# Patient Record
Sex: FEMALE | Race: BLACK OR AFRICAN AMERICAN | NOT HISPANIC OR LATINO | Employment: FULL TIME | URBAN - METROPOLITAN AREA
[De-identification: names, ages, dates, MRNs, and addresses within clinical notes are randomized per-mention and may not be internally consistent; named-entity substitution may affect disease eponyms.]

---

## 2023-10-31 PROBLEM — D57.3 SICKLE CELL TRAIT (CMS-HCC): Status: ACTIVE | Noted: 2023-10-31

## 2023-10-31 PROBLEM — B96.89 BV (BACTERIAL VAGINOSIS): Status: ACTIVE | Noted: 2023-10-31

## 2023-10-31 PROBLEM — N76.0 BV (BACTERIAL VAGINOSIS): Status: ACTIVE | Noted: 2023-10-31

## 2023-10-31 PROBLEM — N76.4 VULVAR ABSCESS: Status: ACTIVE | Noted: 2023-10-31

## 2023-10-31 PROBLEM — N75.0 BARTHOLIN'S CYST: Status: ACTIVE | Noted: 2023-10-31

## 2023-10-31 PROBLEM — E04.9 GOITER: Status: ACTIVE | Noted: 2023-10-31

## 2023-10-31 RX ORDER — MINOCYCLINE HYDROCHLORIDE 55 MG/1
1 TABLET, FILM COATED, EXTENDED RELEASE ORAL
COMMUNITY
Start: 2016-09-16 | End: 2023-11-01 | Stop reason: ALTCHOICE

## 2023-10-31 RX ORDER — IBUPROFEN 600 MG/1
600 TABLET ORAL EVERY 6 HOURS PRN
COMMUNITY
Start: 2019-07-20 | End: 2023-11-01 | Stop reason: ALTCHOICE

## 2023-10-31 RX ORDER — AZITHROMYCIN 250 MG/1
TABLET, FILM COATED ORAL
COMMUNITY
Start: 2021-12-13 | End: 2023-11-01 | Stop reason: ALTCHOICE

## 2023-10-31 RX ORDER — DOXYCYCLINE 100 MG/1
CAPSULE ORAL
COMMUNITY
Start: 2022-09-19 | End: 2023-11-01 | Stop reason: ALTCHOICE

## 2023-10-31 RX ORDER — MINOCYCLINE HYDROCHLORIDE 100 MG/1
100 CAPSULE ORAL
COMMUNITY
Start: 2016-09-19 | End: 2023-11-01 | Stop reason: ALTCHOICE

## 2023-10-31 RX ORDER — ASPIRIN 81 MG/1
2 TABLET ORAL DAILY
COMMUNITY
Start: 2022-05-03 | End: 2023-11-01 | Stop reason: ALTCHOICE

## 2023-10-31 RX ORDER — TRAMADOL HYDROCHLORIDE 50 MG/1
1 TABLET ORAL EVERY 8 HOURS
COMMUNITY
Start: 2022-06-09 | End: 2023-11-01 | Stop reason: ALTCHOICE

## 2023-10-31 RX ORDER — BUTALBITAL, ACETAMINOPHEN AND CAFFEINE 300; 40; 50 MG/1; MG/1; MG/1
1 CAPSULE ORAL EVERY 4 HOURS PRN
COMMUNITY
Start: 2021-09-15 | End: 2023-11-01 | Stop reason: ALTCHOICE

## 2023-10-31 RX ORDER — METRONIDAZOLE 250 MG/1
TABLET ORAL
COMMUNITY
Start: 2022-09-19 | End: 2023-11-01 | Stop reason: ALTCHOICE

## 2023-10-31 RX ORDER — DOCUSATE SODIUM 100 MG/1
100 CAPSULE, LIQUID FILLED ORAL 2 TIMES DAILY
COMMUNITY
Start: 2019-07-20 | End: 2023-11-01 | Stop reason: ALTCHOICE

## 2023-10-31 RX ORDER — CLINDAMYCIN PHOSPHATE 10 UG/ML
1 LOTION TOPICAL
COMMUNITY
Start: 2016-09-16 | End: 2023-11-01 | Stop reason: ALTCHOICE

## 2023-10-31 RX ORDER — ONDANSETRON 4 MG/1
4 TABLET, ORALLY DISINTEGRATING ORAL EVERY 8 HOURS PRN
COMMUNITY
Start: 2022-05-03 | End: 2023-11-01 | Stop reason: ALTCHOICE

## 2023-10-31 RX ORDER — METRONIDAZOLE 7.5 MG/G
GEL VAGINAL
COMMUNITY
Start: 2022-10-03 | End: 2023-11-01 | Stop reason: ALTCHOICE

## 2023-10-31 RX ORDER — ACETAMINOPHEN 325 MG/1
3 TABLET ORAL EVERY 8 HOURS PRN
COMMUNITY
Start: 2021-01-30 | End: 2023-11-01 | Stop reason: ALTCHOICE

## 2023-11-01 ENCOUNTER — INITIAL PRENATAL (OUTPATIENT)
Dept: OBSTETRICS AND GYNECOLOGY | Facility: CLINIC | Age: 23
End: 2023-11-01
Payer: COMMERCIAL

## 2023-11-01 VITALS — BODY MASS INDEX: 22.68 KG/M2 | WEIGHT: 148.8 LBS | DIASTOLIC BLOOD PRESSURE: 77 MMHG | SYSTOLIC BLOOD PRESSURE: 116 MMHG

## 2023-11-01 DIAGNOSIS — O21.9 NAUSEA AND VOMITING IN PREGNANCY PRIOR TO 22 WEEKS GESTATION (HHS-HCC): Primary | ICD-10-CM

## 2023-11-01 DIAGNOSIS — Z87.59 HISTORY OF VACUUM EXTRACTION ASSISTED DELIVERY: ICD-10-CM

## 2023-11-01 DIAGNOSIS — Z87.59 HISTORY OF GESTATIONAL HYPERTENSION: ICD-10-CM

## 2023-11-01 DIAGNOSIS — N92.6 MISSED MENSES: ICD-10-CM

## 2023-11-01 DIAGNOSIS — D57.3 SICKLE CELL TRAIT (CMS-HCC): ICD-10-CM

## 2023-11-01 DIAGNOSIS — Z32.01 PREGNANCY TEST POSITIVE (HHS-HCC): ICD-10-CM

## 2023-11-01 LAB — PREGNANCY TEST URINE, POC: POSITIVE

## 2023-11-01 PROCEDURE — 87800 DETECT AGNT MULT DNA DIREC: CPT | Performed by: OBSTETRICS & GYNECOLOGY

## 2023-11-01 PROCEDURE — 87086 URINE CULTURE/COLONY COUNT: CPT | Performed by: OBSTETRICS & GYNECOLOGY

## 2023-11-01 PROCEDURE — 0500F INITIAL PRENATAL CARE VISIT: CPT | Performed by: OBSTETRICS & GYNECOLOGY

## 2023-11-01 PROCEDURE — 87661 TRICHOMONAS VAGINALIS AMPLIF: CPT | Mod: 59 | Performed by: OBSTETRICS & GYNECOLOGY

## 2023-11-01 RX ORDER — HYDROCORTISONE 25 MG/G
1 CREAM TOPICAL
COMMUNITY
Start: 2016-08-30 | End: 2023-11-01 | Stop reason: ALTCHOICE

## 2023-11-01 RX ORDER — TRETINOIN 0.25 MG/G
1 CREAM TOPICAL
COMMUNITY
Start: 2016-09-16 | End: 2023-11-01 | Stop reason: ALTCHOICE

## 2023-11-01 RX ORDER — PYRIDOXINE HCL (VITAMIN B6) 25 MG
25 TABLET ORAL EVERY 8 HOURS
Qty: 90 TABLET | Refills: 2 | Status: SHIPPED | OUTPATIENT
Start: 2023-11-01 | End: 2024-01-30

## 2023-11-01 RX ORDER — ONDANSETRON 4 MG/1
4 TABLET, FILM COATED ORAL EVERY 12 HOURS PRN
Qty: 30 TABLET | Refills: 0 | Status: SHIPPED | OUTPATIENT
Start: 2023-11-01 | End: 2023-12-01

## 2023-11-01 ASSESSMENT — EDINBURGH POSTNATAL DEPRESSION SCALE (EPDS)
THINGS HAVE BEEN GETTING ON TOP OF ME: NO, I HAVE BEEN COPING AS WELL AS EVER
I HAVE BEEN ABLE TO LAUGH AND SEE THE FUNNY SIDE OF THINGS: AS MUCH AS I ALWAYS COULD
THE THOUGHT OF HARMING MYSELF HAS OCCURRED TO ME: NEVER
I HAVE BEEN SO UNHAPPY THAT I HAVE HAD DIFFICULTY SLEEPING: NOT VERY OFTEN
I HAVE FELT SAD OR MISERABLE: NO, NOT AT ALL
I HAVE BLAMED MYSELF UNNECESSARILY WHEN THINGS WENT WRONG: NOT VERY OFTEN
I HAVE FELT SCARED OR PANICKY FOR NO GOOD REASON: NO, NOT AT ALL
TOTAL SCORE: 5
I HAVE BEEN SO UNHAPPY THAT I HAVE BEEN CRYING: ONLY OCCASIONALLY
I HAVE BEEN ANXIOUS OR WORRIED FOR NO GOOD REASON: YES, SOMETIMES
I HAVE LOOKED FORWARD WITH ENJOYMENT TO THINGS: AS MUCH AS I EVER DID

## 2023-11-01 NOTE — PROGRESS NOTES
Subjective   oJb Metz is a 23 y.o.  at 5w5d here to establish OB care.  BF Edbria. Daughter Marce. She goes by Alexus. Prefers MD care.    Her pregnancy is notable for:  NVP  Hx VAVD for 6#7oz baby girl for prolonged second stage per legacy delivery note   Hx 37 week IOL for GHTN  Sickle cell trait    Obstetrical History   OB History    Para Term  AB Living   3 1 1 0 1 1   SAB IAB Ectopic Multiple Live Births   1 0 0 0 1      # Outcome Date GA Lbr Shane/2nd Weight Sex Delivery Anes PTL Lv   3 Current            2 SAB 08/15/22        ND   1 Term 19 37w0d   F Vag-Vacuum EPI  LEONORA     Gynecologic History  Patient's last menstrual period was 2023.  Cycles regular without dysmenorrhea or menorrhagia  HSV: denies  LEEP: denies    Past Medical History  Goiter    Past Surgical History   Hx Bartholin gland marsupialization 2023    Social History  Partner: Gibson  Occupation: sales and marketing  Substances: No T/E/D  Abuse: denies    Allergies  Other    Medications  PNV    Objective   Vitals:    23 1047   BP: 116/77     Physical Examination   General:   Alert and oriented, in no acute distress   Neck: Supple. No visible thyromegaly.    Abdomen: Soft, non-tender, gravid   : Normal genitourinary exam without lesions, masses, or abnormal discharge   Psych Normal affect. Normal mood.      TAUS: + GS, no FP yet    Assessment   Assessment/Plan  Pt was oriented to the practice. Prenatal labs and ultrasound were ordered. Problem list and OB overview updated.     Diagnoses and all orders for this visit:  Nausea and vomiting in pregnancy prior to 22 weeks gestation (Primary)  -     pyridoxine (Vitamin B-6) 25 mg tablet; Take 1 tablet (25 mg) by mouth every 8 hours.  -     doxylamine (Unisom, doxylamine,) 25 mg tablet; Take 1 tablet (25 mg) by mouth as needed at bedtime for sleep or nausea.  -     ondansetron (Zofran) 4 mg tablet; Take 1 tablet (4 mg) by mouth every 12 hours if needed  for nausea or vomiting.  Missed menses  -     POC pregnancy, urine  Pregnancy test positive  -     POC pregnancy, urine  -     prenatal vitamin, iron-folic, 27 mg iron-800 mcg folic acid tablet; Take 1 tablet by mouth once daily.  -     Urine Culture  -     C. Trachomatis / N. Gonorrhoeae, Amplified Detection  -     TRICH VAGINALIS, AMPLIFIED    Return OB Visit: 4-6 weeks    Deanna Santoro MD

## 2023-11-01 NOTE — PATIENT INSTRUCTIONS
Welcome to Dr. [facility]!  Below are some commonly asked questions about this practice.    Q: Does Dr. Santoro work by herself or as part of a physician group?  Dr. Santoro works with a group of other doctors, as well as midwives, in the Department of Ob/Gyn at [facility]. Most of your visits will be with Dr. Santoro, but if she is unavailable, an appointment may be scheduled with one of her partners.     Q: How often do I need to see Dr. Santoro during my pregnancy?  You need to be seen monthly until the third trimester, then every two weeks from 32-36 weeks, and then weekly until delivery. Some patients may need to be seen more frequently.     Q: What does a prenatal visit involve?  Prenatal appointments are scheduled for 15 minutes, and include getting vital signs, basic labs like urine analysis, and discussion of prenatal symptoms and care recommendations. Ultrasounds are separate and do not replace prenatal visits. If you want to discuss something in depth, you may need to schedule more than one visit to complete the conversation.    Q: Does [facility] utilize a patient portal?  Yes!  [employer] is the strongly preferred way for you to communicate with your healthcare team and to manage your appointments, medications, labs, and more. More info can be found at: [email or web address]     Q: I need to reschedule or cancel an appointment. What should I do?  In order to meet the needs of all our patients, we respectfully ask that you give at least 24 hours notice if you need to cancel or reschedule an appointment.  Please call the office directly to alert them of your change in plans. Patients who repeatedly miss appointments may be dismissed from the practice.    Q: How can I reach Dr. Santoro or her staff?  You should always call the office if you have worries - we will never think you are being a bother or silly! For non-urgent requests, the best way to communicate with us is  through  [employer]. For urgent issues, you can call the office. During office hours, the  can transfer you to the nurse line, and after office hours, an answering service can page the doctor on call.    Q: Who will deliver my baby?  Our practice is a group model, meaning Dr. Santoro only works on Labor & Delivery a few times a month. Importantly, Dr. Santoro only delivers at [facility], not the [facility]. Therefore, it is common for her patients to be delivered by one of her partners at either location. Each of her partners have access to your prenatal information, and hold the same high standards she holds when it comes to caring for you.    Q: What insurances are accepted by Dr. Santoro?  Please check with your insurance carrier to confirm that both [facility] and Dr. Santoro are within your network. If you have concerns about losing insurance coverage, you may contact the  Insurance Access Line at [phone].      DR. SANTORO'S PREGNANCY PREP GUIDE    Pregnancy is a life-changing journey, each and every time. Below are some materials and information that may build your confidence, comfort, and knowledge!    SMARTPHONE APPS  [employer] Pregnancy  Free jose that allows you to track your pregnancy's gestational age, fetal growth, and fetal development    [employer]  Information on safety of medications in both pregnancy and breastfeeding, created by the Infant Risk Center at [facility]. You can also contact the Infant Risk organization if the medication you have questions about is not listed in the jose.    BOOKS  [facility] Guide to a Healthy Pregnancy: From Doctors who are Parents, Too!  User friendly, practical book by a trusted resource    [facility] Hospital Birth   A non-biased guide to achieving a physiologic birth in a hospital setting, focusing on what you can do, and how to communicate effectively with staff. I recommend pairing this with the website Evidence Based Birth® to  stimulate some thoughtful conversation with your OB provider.    The Fourth Trimester   [employer] postpartum advice from an experienced  and midwife who has worked in high risk settings. Discusses sleep, feeding, intimacy, work, and other important topics from the postpartum period.    The Womanly Art of Breastfeeding  Written by the Faith Elder, this book has all sorts of practical tips to help you succeed in breastfeeding    Caring for Your Baby and Young Child: Birth to Age 5  Created by the American Academy of Pediatrics, this user-friendly manual provides practical and evidence-based advice for caring for your baby after birth.    PRENATAL NUTRITION  A balanced and healthy diet is good for mom and baby. In general, aim for the following amounts, either through diet or supplementation: folic acid 800mcg daily, omega 3 fatty acids (such as DHA) 250mg daily, Vitamin D3 800IU daily, calcium 1200mg daily, and choline 450mg daily. Fish is a wonderful source of protein and choline (good for baby's brain!) and you can find the FDA's advisory for eating fish during pregnancy here.     PRODUCTS  There is no end to pregnancy-related products. It is, after all, a multi-billion dollar industry. Good sleep, good nutrition, and good stress reduction will help your body cope with pregnancy and recover from delivery better than any one product.     That being said, here are some general items that are helpful during pregnancy:   Maternity clothes: There's no need to test the limits of your pre-pregnancy clothes. Switching into maternity clothes in the second trimester often helps moms feel physically and emotionally more comfortable.  Pregnancy pillow: A good night's sleep is going to be elusive especially in the third trimester. Consider purchasing a supportive body pillow to support your back, stomach, and legs.   Maternity support belt: Pelvic and hip pain can start as early as the second trimester. Ask our  office about maternity belts that may be covered by your insurance. You can also buy belts for around $30 online.  Compression socks: Blood can pool in the legs during pregnancy, causing swelling. Consider wearing compression stockings if you start to experience swelling, and especially if you'll be doing any traveling.  Rich body lotions: Anything that maintains the skin's hydration and elasticity helps diminish the appearance of stretch marks.     Other helpful items to keep around:  a water bottle to keep yourself hydrated, a good heating pad and massage roller for body aches, a yoga ball to sit on in the third trimester, a laxative like Miralax for constipation, TUMs for heartburn, a humidifier and nasal strips for congestion, and high fiber snacks for when you get hungry.     PAPERWORK, PUMPS, AND MORE  Need a proof of pregnancy form? A note for your dentist? A work note? Our office staff should be able to provide you with a copy of any of these forms at any of your visits. Short-term disability and Family Medical Leave Act (FMLA) paperwork should be submitted prior to your due date. Please allow 5-10 business days for processing.     Our office provides a prescription for a breast pump at your request. Many insurance companies cover breast pumps in full. We recommend a double electric breast pump that is portable.    Finally, good communication is espinoza to good health. Please make sure your phone number, email, and/or address is accurate in our system, and promptly notify the office of any changes.    We strive to provide our patients with the best possible care during their pregnancies, and we are constantly aiming to improve. If you have feedback to give Dr. Santoro about her office or practice, feel free to message her via  [employer].

## 2023-11-02 PROBLEM — Z87.59 HISTORY OF VACUUM EXTRACTION ASSISTED DELIVERY: Status: ACTIVE | Noted: 2023-11-02

## 2023-11-02 PROBLEM — O21.9 NAUSEA AND VOMITING IN PREGNANCY PRIOR TO 22 WEEKS GESTATION (HHS-HCC): Status: ACTIVE | Noted: 2023-11-02

## 2023-11-02 PROBLEM — E04.9 GOITER: Status: RESOLVED | Noted: 2023-10-31 | Resolved: 2023-11-02

## 2023-11-02 PROBLEM — Z32.01 PREGNANCY TEST POSITIVE (HHS-HCC): Status: ACTIVE | Noted: 2023-11-02

## 2023-11-02 PROBLEM — N75.0 BARTHOLIN'S CYST: Status: RESOLVED | Noted: 2023-10-31 | Resolved: 2023-11-02

## 2023-11-02 PROBLEM — Z87.59 HISTORY OF GESTATIONAL HYPERTENSION: Status: ACTIVE | Noted: 2023-11-02

## 2023-11-02 LAB
BACTERIA UR CULT: NORMAL
C TRACH RRNA SPEC QL NAA+PROBE: NEGATIVE
N GONORRHOEA DNA SPEC QL PROBE+SIG AMP: NEGATIVE
T VAGINALIS RRNA SPEC QL NAA+PROBE: NEGATIVE

## 2023-12-21 ENCOUNTER — APPOINTMENT (OUTPATIENT)
Dept: RADIOLOGY | Facility: HOSPITAL | Age: 23
End: 2023-12-21
Payer: COMMERCIAL

## 2023-12-21 ENCOUNTER — HOSPITAL ENCOUNTER (EMERGENCY)
Facility: HOSPITAL | Age: 23
Discharge: HOME | End: 2023-12-21
Attending: GENERAL PRACTICE
Payer: COMMERCIAL

## 2023-12-21 VITALS
RESPIRATION RATE: 18 BRPM | OXYGEN SATURATION: 99 % | TEMPERATURE: 98.6 F | HEIGHT: 68 IN | DIASTOLIC BLOOD PRESSURE: 76 MMHG | BODY MASS INDEX: 19.7 KG/M2 | HEART RATE: 87 BPM | SYSTOLIC BLOOD PRESSURE: 118 MMHG | WEIGHT: 130 LBS

## 2023-12-21 DIAGNOSIS — O21.9 VOMITING DURING PREGNANCY (HHS-HCC): Primary | ICD-10-CM

## 2023-12-21 LAB
ALBUMIN SERPL BCP-MCNC: 4.2 G/DL (ref 3.4–5)
ALP SERPL-CCNC: 36 U/L (ref 33–110)
ALT SERPL W P-5'-P-CCNC: 7 U/L (ref 7–45)
ANION GAP SERPL CALC-SCNC: 12 MMOL/L (ref 10–20)
APPEARANCE UR: ABNORMAL
AST SERPL W P-5'-P-CCNC: 14 U/L (ref 9–39)
B-HCG SERPL-ACNC: ABNORMAL MIU/ML
BACTERIA #/AREA URNS AUTO: ABNORMAL /HPF
BASOPHILS # BLD AUTO: 0.02 X10*3/UL (ref 0–0.1)
BASOPHILS NFR BLD AUTO: 0.3 %
BILIRUB SERPL-MCNC: 0.4 MG/DL (ref 0–1.2)
BILIRUB UR STRIP.AUTO-MCNC: NEGATIVE MG/DL
BUN SERPL-MCNC: 4 MG/DL (ref 6–23)
CALCIUM SERPL-MCNC: 9.2 MG/DL (ref 8.6–10.3)
CHLORIDE SERPL-SCNC: 102 MMOL/L (ref 98–107)
CO2 SERPL-SCNC: 25 MMOL/L (ref 21–32)
COLOR UR: YELLOW
CREAT SERPL-MCNC: 0.59 MG/DL (ref 0.5–1.05)
EOSINOPHIL # BLD AUTO: 0.06 X10*3/UL (ref 0–0.7)
EOSINOPHIL NFR BLD AUTO: 1 %
ERYTHROCYTE [DISTWIDTH] IN BLOOD BY AUTOMATED COUNT: 13.2 % (ref 11.5–14.5)
FLUAV RNA RESP QL NAA+PROBE: NOT DETECTED
FLUBV RNA RESP QL NAA+PROBE: NOT DETECTED
GFR SERPL CREATININE-BSD FRML MDRD: >90 ML/MIN/1.73M*2
GLUCOSE SERPL-MCNC: 113 MG/DL (ref 74–99)
GLUCOSE UR STRIP.AUTO-MCNC: ABNORMAL MG/DL
HCT VFR BLD AUTO: 35.8 % (ref 36–46)
HGB BLD-MCNC: 12.3 G/DL (ref 12–16)
IMM GRANULOCYTES # BLD AUTO: 0.02 X10*3/UL (ref 0–0.7)
IMM GRANULOCYTES NFR BLD AUTO: 0.3 % (ref 0–0.9)
KETONES UR STRIP.AUTO-MCNC: ABNORMAL MG/DL
LEUKOCYTE ESTERASE UR QL STRIP.AUTO: ABNORMAL
LYMPHOCYTES # BLD AUTO: 1.5 X10*3/UL (ref 1.2–4.8)
LYMPHOCYTES NFR BLD AUTO: 25.8 %
MCH RBC QN AUTO: 29.4 PG (ref 26–34)
MCHC RBC AUTO-ENTMCNC: 34.4 G/DL (ref 32–36)
MCV RBC AUTO: 86 FL (ref 80–100)
MONOCYTES # BLD AUTO: 0.28 X10*3/UL (ref 0.1–1)
MONOCYTES NFR BLD AUTO: 4.8 %
MUCOUS THREADS #/AREA URNS AUTO: ABNORMAL /LPF
NEUTROPHILS # BLD AUTO: 3.94 X10*3/UL (ref 1.2–7.7)
NEUTROPHILS NFR BLD AUTO: 67.8 %
NITRITE UR QL STRIP.AUTO: NEGATIVE
NRBC BLD-RTO: 0 /100 WBCS (ref 0–0)
PH UR STRIP.AUTO: 6 [PH]
PLATELET # BLD AUTO: 307 X10*3/UL (ref 150–450)
POTASSIUM SERPL-SCNC: 4 MMOL/L (ref 3.5–5.3)
PROT SERPL-MCNC: 7.1 G/DL (ref 6.4–8.2)
PROT UR STRIP.AUTO-MCNC: NEGATIVE MG/DL
RBC # BLD AUTO: 4.18 X10*6/UL (ref 4–5.2)
RBC # UR STRIP.AUTO: NEGATIVE /UL
RBC #/AREA URNS AUTO: ABNORMAL /HPF
SARS-COV-2 RNA RESP QL NAA+PROBE: NOT DETECTED
SODIUM SERPL-SCNC: 135 MMOL/L (ref 136–145)
SP GR UR STRIP.AUTO: 1.01
SQUAMOUS #/AREA URNS AUTO: ABNORMAL /HPF
UROBILINOGEN UR STRIP.AUTO-MCNC: <2 MG/DL
WBC # BLD AUTO: 5.8 X10*3/UL (ref 4.4–11.3)
WBC #/AREA URNS AUTO: ABNORMAL /HPF
WBC CLUMPS #/AREA URNS AUTO: ABNORMAL /HPF

## 2023-12-21 PROCEDURE — 36415 COLL VENOUS BLD VENIPUNCTURE: CPT | Performed by: PHYSICIAN ASSISTANT

## 2023-12-21 PROCEDURE — 85025 COMPLETE CBC W/AUTO DIFF WBC: CPT | Performed by: PHYSICIAN ASSISTANT

## 2023-12-21 PROCEDURE — 76815 OB US LIMITED FETUS(S): CPT

## 2023-12-21 PROCEDURE — 96366 THER/PROPH/DIAG IV INF ADDON: CPT

## 2023-12-21 PROCEDURE — 2500000004 HC RX 250 GENERAL PHARMACY W/ HCPCS (ALT 636 FOR OP/ED): Performed by: GENERAL PRACTICE

## 2023-12-21 PROCEDURE — 87636 SARSCOV2 & INF A&B AMP PRB: CPT | Performed by: GENERAL PRACTICE

## 2023-12-21 PROCEDURE — 81001 URINALYSIS AUTO W/SCOPE: CPT | Performed by: PHYSICIAN ASSISTANT

## 2023-12-21 PROCEDURE — 80053 COMPREHEN METABOLIC PANEL: CPT | Performed by: PHYSICIAN ASSISTANT

## 2023-12-21 PROCEDURE — 96365 THER/PROPH/DIAG IV INF INIT: CPT

## 2023-12-21 PROCEDURE — 76815 OB US LIMITED FETUS(S): CPT | Performed by: RADIOLOGY

## 2023-12-21 PROCEDURE — 96375 TX/PRO/DX INJ NEW DRUG ADDON: CPT

## 2023-12-21 PROCEDURE — 84702 CHORIONIC GONADOTROPIN TEST: CPT | Performed by: GENERAL PRACTICE

## 2023-12-21 PROCEDURE — 87086 URINE CULTURE/COLONY COUNT: CPT | Mod: AHULAB | Performed by: PHYSICIAN ASSISTANT

## 2023-12-21 PROCEDURE — 99284 EMERGENCY DEPT VISIT MOD MDM: CPT | Performed by: GENERAL PRACTICE

## 2023-12-21 PROCEDURE — 2500000001 HC RX 250 WO HCPCS SELF ADMINISTERED DRUGS (ALT 637 FOR MEDICARE OP): Performed by: GENERAL PRACTICE

## 2023-12-21 RX ORDER — METOCLOPRAMIDE 10 MG/1
10 TABLET ORAL EVERY 8 HOURS PRN
Qty: 21 TABLET | Refills: 0 | Status: SHIPPED | OUTPATIENT
Start: 2023-12-21 | End: 2023-12-28

## 2023-12-21 RX ORDER — ACETAMINOPHEN 325 MG/1
975 TABLET ORAL ONCE
Status: COMPLETED | OUTPATIENT
Start: 2023-12-21 | End: 2023-12-21

## 2023-12-21 RX ORDER — ONDANSETRON HYDROCHLORIDE 2 MG/ML
4 INJECTION, SOLUTION INTRAVENOUS ONCE
Status: COMPLETED | OUTPATIENT
Start: 2023-12-21 | End: 2023-12-21

## 2023-12-21 RX ORDER — CEPHALEXIN 500 MG/1
500 CAPSULE ORAL 2 TIMES DAILY
Qty: 14 CAPSULE | Refills: 0 | Status: SHIPPED | OUTPATIENT
Start: 2023-12-21 | End: 2023-12-28

## 2023-12-21 RX ORDER — CEPHALEXIN 500 MG/1
500 CAPSULE ORAL ONCE
Status: COMPLETED | OUTPATIENT
Start: 2023-12-21 | End: 2023-12-21

## 2023-12-21 RX ORDER — METOCLOPRAMIDE HYDROCHLORIDE 5 MG/ML
10 INJECTION INTRAMUSCULAR; INTRAVENOUS ONCE
Status: COMPLETED | OUTPATIENT
Start: 2023-12-21 | End: 2023-12-21

## 2023-12-21 RX ADMIN — ONDANSETRON 4 MG: 2 INJECTION INTRAMUSCULAR; INTRAVENOUS at 15:48

## 2023-12-21 RX ADMIN — METOCLOPRAMIDE 10 MG: 5 INJECTION, SOLUTION INTRAMUSCULAR; INTRAVENOUS at 18:14

## 2023-12-21 RX ADMIN — DEXTROSE MONOHYDRATE 1000 ML: 50 INJECTION, SOLUTION INTRAVENOUS at 17:55

## 2023-12-21 RX ADMIN — ACETAMINOPHEN 975 MG: 325 TABLET ORAL at 18:14

## 2023-12-21 RX ADMIN — CEPHALEXIN 500 MG: 500 CAPSULE ORAL at 17:00

## 2023-12-21 ASSESSMENT — PAIN - FUNCTIONAL ASSESSMENT: PAIN_FUNCTIONAL_ASSESSMENT: 0-10

## 2023-12-21 ASSESSMENT — PAIN SCALES - GENERAL: PAINLEVEL_OUTOF10: 3

## 2023-12-21 ASSESSMENT — COLUMBIA-SUICIDE SEVERITY RATING SCALE - C-SSRS
6. HAVE YOU EVER DONE ANYTHING, STARTED TO DO ANYTHING, OR PREPARED TO DO ANYTHING TO END YOUR LIFE?: NO
1. IN THE PAST MONTH, HAVE YOU WISHED YOU WERE DEAD OR WISHED YOU COULD GO TO SLEEP AND NOT WAKE UP?: NO
2. HAVE YOU ACTUALLY HAD ANY THOUGHTS OF KILLING YOURSELF?: NO

## 2023-12-21 NOTE — ED PROVIDER NOTES
HPI   Chief Complaint   Patient presents with    Nausea    Vomiting       HPI: 23-year-old G3, P1 (with 1 miscarriage) presents for nausea, vomiting, lower pelvic cramping and vaginal spotting.  She is currently approximately 14 weeks pregnant.  She has been dealing with morning sickness throughout her pregnancy.  She is followed by an obstetrician.  She states that her current symptoms began last evening.  She states no provocative or palliative factors associated with her symptoms      Limitations to history: None  Independent Historians: Patient  External Records Reviewed: HIE, outpatient notes, inpatient notes  ------------------------------------------------------------------------------------------------------------------------------------------  ROS: a ten point review of systems was performed and was negative except as per HPI.  ------------------------------------------------------------------------------------------------------------------------------------------  PMH / PSH: as per HPI, otherwise reviewed in EMR  MEDS: as per HPI, otherwise reviewed in EMR  ALLERGIES: as per HPI, otherwise reviewed in EMR  SocH:  as per HPI, otherwise reviewed in EMR  FH:  as per HPI, otherwise reviewed in EMR  ------------------------------------------------------------------------------------------------------------------------------------------  Physical Exam:  VS: As documented in the triage note and EMR flowsheet from this visit was reviewed  General: Well appearing. No acute distress.   Eyes:  Extraocular movements grossly intact. No scleral icterus. No discharge  HEENT:  Normocephalic.  Atraumatic  Neck: Moves neck freely. No gross masses  CV: Regular rhythm. No murmurs, rubs or gallops   Resp: Clear to auscultation bilaterally. No respiratory distress.    GI: Soft, no masses, nontender. No rebound tenderness or guarding  MSK: Symmetric muscle bulk. No deformities. No lower extremity edema.    Skin: Warm, dry,  intact.   Neuro: No focal deficits.  A&O x3.   Psych: Appropriate for situation  ------------------------------------------------------------------------------------------------------------------------------------------  Hospital Course / Medical Decision Making:  Independent Interpretations: Pelvic US  EKG as interpreted by me: ELIAN    MDM: 23-year-old G3, P1 female presents for nausea, vomiting and lower pelvic cramping and vaginal spotting.  She was given IV fluids and antiemetics in the ED.  Urinalysis is positive for UTI and ketones.  She was given D5W and oral Keflex.  She was given Tylenol for pain.  She denies any current vaginal spotting or discharge.  Pelvic ultrasound shows a live intrauterine pregnancy.  She was provided with a copy of her ultrasound result.  On reevaluation she is able to tolerate oral intake and is pain-free.  She feels safe going home.  She does have an obstetrician that she will follow-up with.  She was provided with a prescription for Reglan and Keflex.  She will return to the ED for any concerning symptoms.    Discussion of Management with Other Providers:   I discussed the patient/results with: Emergency medicine team    Final diagnosis and disposition as below.    Results for orders placed or performed during the hospital encounter of 12/21/23  -CBC and Auto Differential:        Result                      Value             Ref Range           WBC                         5.8               4.4 - 11.3 x*       nRBC                        0.0               0.0 - 0.0 /1*       RBC                         4.18              4.00 - 5.20 *       Hemoglobin                  12.3              12.0 - 16.0 *       Hematocrit                  35.8 (L)          36.0 - 46.0 %       MCV                         86                80 - 100 fL         MCH                         29.4              26.0 - 34.0 *       MCHC                        34.4              32.0 - 36.0 *       RDW                          13.2              11.5 - 14.5 %       Platelets                   307               150 - 450 x1*       Neutrophils %               67.8              40.0 - 80.0 %       Immature Granulocytes *     0.3               0.0 - 0.9 %         Lymphocytes %               25.8              13.0 - 44.0 %       Monocytes %                 4.8               2.0 - 10.0 %        Eosinophils %               1.0               0.0 - 6.0 %         Basophils %                 0.3               0.0 - 2.0 %         Neutrophils Absolute        3.94              1.20 - 7.70 *       Immature Granulocytes *     0.02              0.00 - 0.70 *       Lymphocytes Absolute        1.50              1.20 - 4.80 *       Monocytes Absolute          0.28              0.10 - 1.00 *       Eosinophils Absolute        0.06              0.00 - 0.70 *       Basophils Absolute          0.02              0.00 - 0.10 *  -Comprehensive metabolic panel:        Result                      Value             Ref Range           Glucose                     113 (H)           74 - 99 mg/dL       Sodium                      135 (L)           136 - 145 mm*       Potassium                   4.0               3.5 - 5.3 mm*       Chloride                    102               98 - 107 mmo*       Bicarbonate                 25                21 - 32 mmol*       Anion Gap                   12                10 - 20 mmol*       Urea Nitrogen               4 (L)             6 - 23 mg/dL        Creatinine                  0.59              0.50 - 1.05 *       eGFR                        >90               >60 mL/min/1*       Calcium                     9.2               8.6 - 10.3 m*       Albumin                     4.2               3.4 - 5.0 g/*       Alkaline Phosphatase        36                33 - 110 U/L        Total Protein               7.1               6.4 - 8.2 g/*       AST                         14                9 - 39 U/L          Bilirubin, Total             0.4               0.0 - 1.2 mg*       ALT                         7                 7 - 45 U/L     -Urinalysis with Reflex Culture and Microscopic:        Result                      Value             Ref Range           Color, Urine                Yellow            Straw, Yellow       Appearance, Urine           Cloudy (N)        Clear               Specific Gravity, Urine     1.013             1.005 - 1.035       pH, Urine                   6.0               5.0, 5.5, 6.*       Protein, Urine              NEGATIVE          NEGATIVE mg/*       Glucose, Urine              50 (1+) (A)       NEGATIVE mg/*       Blood, Urine                NEGATIVE          NEGATIVE            Ketones, Urine              5 (TRACE) (A)     NEGATIVE mg/*       Bilirubin, Urine            NEGATIVE          NEGATIVE            Urobilinogen, Urine         <2.0              <2.0 mg/dL          Nitrite, Urine              NEGATIVE          NEGATIVE            Leukocyte Esterase, Ur*                       NEGATIVE        MODERATE (2+) (A)  -Human Chorionic Gonadotropin, Serum Quantitative:        Result                      Value             Ref Range           HCG, Beta-Quantitative      75,802 (H)        <5 mIU/mL      -Sars-CoV-2 and Influenza A/B PCR:        Result                      Value             Ref Range           Flu A Result                Not Detected      Not Detected        Flu B Result                Not Detected      Not Detected        Coronavirus 2019, PCR       Not Detected      Not Detected   -Microscopic Only, Urine:        Result                      Value             Ref Range           WBC, Urine                  11-20 (A)         1-5, NONE /H*       WBC Clumps, Urine           OCCASIONAL        Reference ra*       RBC, Urine                  3-5               NONE, 1-2, 3*       Squamous Epithelial Ce*     10-25 (FEW)       Reference ra*       Bacteria, Urine             1+ (A)            NONE SEEN /H*        Mucus, Urine                1+                Reference ra*  US OB limited 1+ fetuses   Final Result    1. Single live intrauterine pregnancy.    2. Estimated gestational age:  13 weeks  4 days.    3. Poorly delineated asymmetry right ovary may be due to normal    variation or corpus luteum. Attention recommended on follow-up    assessment.                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    Postinflammatory hyperpigmentation 09/16/2016     Past Surgical History:   Procedure Laterality Date    BARTHOLIN GLAND CYST EXCISION Left 02/2023     Family History   Problem Relation Name Age of Onset    Hypertension Paternal Grandmother       Social History     Tobacco Use    Smoking status: Never    Smokeless tobacco: Never   Vaping Use    Vaping Use: Never used   Substance Use Topics    Alcohol use: Never    Drug use: Never       Physical Exam   ED Triage Vitals [12/21/23 1458]   Temp Heart Rate Resp BP   37 °C (98.6 °F) 90 16 118/76      SpO2 Temp src Heart Rate Source Patient Position   99 % -- -- --      BP Location FiO2 (%)     -- --       Physical Exam    ED Course & MDM   Diagnoses as of 12/24/23 1114   Vomiting during pregnancy       Medical Decision Making      Procedure  Procedures     Bob Sahu, DO  12/24/23 1116

## 2023-12-21 NOTE — ED TRIAGE NOTES
TRIAGE NOTE   I saw the patient as the Clinician in Triage and performed a brief history and physical exam, established acuity, and ordered appropriate tests to develop basic plan of care. Patient will be seen by an [facility], resident and/or physician who will independently evaluate the patient. Please see subsequent provider notes for further details and disposition.     Brief HPI: In brief, Job Metz is a 23 y.o. female that presents for worsening nausea vomiting for 4 days and second trimester pregnancy.  Reports 14 weeks IUP with prior ultrasound.  Has not yet seen OB/GYN services.  She has had morning sickness throughout the pregnancy but worse the last 4 days.  No diarrhea.  Has been having some pelvic discomfort with suprapubic tenderness no urinary symptoms.  Focused Physical exam:   Vital signs are stable.  Nontoxic alert coherent.  No active vomiting.  Abdomen soft with suprapubic tenderness.  Nonsurgical exam.  Pelvic exam deferred in triage.  Plan/MDM:   Nausea vomiting early pregnancy.  Labs initiated.  Patient reports she had an ultrasound confirming IUP and [facility] system.  Please see subsequent provider note for further details and disposition

## 2023-12-22 LAB — HOLD SPECIMEN: NORMAL

## 2023-12-22 NOTE — DISCHARGE INSTRUCTIONS
See your obstetrician as soon as possible.  Take your medications as directed.  Return to the ER for worsening of your symptoms or any other concerning symptoms.

## 2023-12-23 LAB — BACTERIA UR CULT: NORMAL

## 2024-01-11 ENCOUNTER — ANCILLARY PROCEDURE (OUTPATIENT)
Dept: RADIOLOGY | Facility: CLINIC | Age: 24
End: 2024-01-11
Payer: COMMERCIAL

## 2024-01-11 DIAGNOSIS — Z32.01 PREGNANCY TEST POSITIVE (HHS-HCC): ICD-10-CM

## 2024-01-11 DIAGNOSIS — Z32.01 PREGNANCY TEST POSITIVE (HHS-HCC): Primary | ICD-10-CM

## 2024-01-11 PROCEDURE — 76815 OB US LIMITED FETUS(S): CPT | Performed by: OBSTETRICS & GYNECOLOGY

## 2024-01-11 PROCEDURE — 76815 OB US LIMITED FETUS(S): CPT

## 2024-02-05 ENCOUNTER — HOSPITAL ENCOUNTER (OUTPATIENT)
Dept: RADIOLOGY | Facility: CLINIC | Age: 24
Discharge: HOME | End: 2024-02-05
Payer: COMMERCIAL

## 2024-02-05 DIAGNOSIS — Z32.01 PREGNANCY TEST POSITIVE (HHS-HCC): ICD-10-CM

## 2024-02-05 PROCEDURE — 76805 OB US >/= 14 WKS SNGL FETUS: CPT

## 2024-02-05 PROCEDURE — 76805 OB US >/= 14 WKS SNGL FETUS: CPT | Performed by: OBSTETRICS & GYNECOLOGY

## 2024-03-11 ENCOUNTER — ROUTINE PRENATAL (OUTPATIENT)
Dept: OBSTETRICS AND GYNECOLOGY | Facility: CLINIC | Age: 24
End: 2024-03-11
Payer: COMMERCIAL

## 2024-03-11 ENCOUNTER — LAB (OUTPATIENT)
Dept: LAB | Facility: LAB | Age: 24
End: 2024-03-11
Payer: COMMERCIAL

## 2024-03-11 VITALS — DIASTOLIC BLOOD PRESSURE: 70 MMHG | WEIGHT: 156 LBS | SYSTOLIC BLOOD PRESSURE: 105 MMHG | BODY MASS INDEX: 23.72 KG/M2

## 2024-03-11 DIAGNOSIS — O09.32 INSUFFICIENT PRENATAL CARE IN SECOND TRIMESTER (HHS-HCC): ICD-10-CM

## 2024-03-11 DIAGNOSIS — R10.2 PELVIC PAIN AFFECTING PREGNANCY IN SECOND TRIMESTER, ANTEPARTUM (HHS-HCC): ICD-10-CM

## 2024-03-11 DIAGNOSIS — O21.9 NAUSEA AND VOMITING IN PREGNANCY PRIOR TO 22 WEEKS GESTATION (HHS-HCC): ICD-10-CM

## 2024-03-11 DIAGNOSIS — O09.32 INSUFFICIENT PRENATAL CARE IN SECOND TRIMESTER (HHS-HCC): Primary | ICD-10-CM

## 2024-03-11 DIAGNOSIS — N89.8 VAGINAL ODOR: ICD-10-CM

## 2024-03-11 DIAGNOSIS — B37.9 YEAST INFECTION: ICD-10-CM

## 2024-03-11 DIAGNOSIS — O26.892 PELVIC PAIN AFFECTING PREGNANCY IN SECOND TRIMESTER, ANTEPARTUM (HHS-HCC): ICD-10-CM

## 2024-03-11 LAB
ERYTHROCYTE [DISTWIDTH] IN BLOOD BY AUTOMATED COUNT: 12.5 % (ref 11.5–14.5)
GLUCOSE 1H P 50 G GLC PO SERPL-MCNC: 98 MG/DL
HCT VFR BLD AUTO: 29.9 % (ref 36–46)
HGB BLD-MCNC: 10.1 G/DL (ref 12–16)
MCH RBC QN AUTO: 28.5 PG (ref 26–34)
MCHC RBC AUTO-ENTMCNC: 33.8 G/DL (ref 32–36)
MCV RBC AUTO: 85 FL (ref 80–100)
NRBC BLD-RTO: 0 /100 WBCS (ref 0–0)
PLATELET # BLD AUTO: 242 X10*3/UL (ref 150–450)
RBC # BLD AUTO: 3.54 X10*6/UL (ref 4–5.2)
WBC # BLD AUTO: 9.4 X10*3/UL (ref 4.4–11.3)

## 2024-03-11 PROCEDURE — 87389 HIV-1 AG W/HIV-1&-2 AB AG IA: CPT

## 2024-03-11 PROCEDURE — 86803 HEPATITIS C AB TEST: CPT

## 2024-03-11 PROCEDURE — 86317 IMMUNOASSAY INFECTIOUS AGENT: CPT

## 2024-03-11 PROCEDURE — 82947 ASSAY GLUCOSE BLOOD QUANT: CPT

## 2024-03-11 PROCEDURE — 99213 OFFICE O/P EST LOW 20 MIN: CPT | Performed by: ADVANCED PRACTICE MIDWIFE

## 2024-03-11 PROCEDURE — 82607 VITAMIN B-12: CPT

## 2024-03-11 PROCEDURE — 86780 TREPONEMA PALLIDUM: CPT

## 2024-03-11 PROCEDURE — 87340 HEPATITIS B SURFACE AG IA: CPT

## 2024-03-11 PROCEDURE — 82746 ASSAY OF FOLIC ACID SERUM: CPT

## 2024-03-11 PROCEDURE — 82728 ASSAY OF FERRITIN: CPT

## 2024-03-11 PROCEDURE — 36415 COLL VENOUS BLD VENIPUNCTURE: CPT

## 2024-03-11 PROCEDURE — 87205 SMEAR GRAM STAIN: CPT

## 2024-03-11 PROCEDURE — 83550 IRON BINDING TEST: CPT

## 2024-03-11 PROCEDURE — 85027 COMPLETE CBC AUTOMATED: CPT

## 2024-03-11 PROCEDURE — 0501F PRENATAL FLOW SHEET: CPT | Performed by: ADVANCED PRACTICE MIDWIFE

## 2024-03-11 NOTE — PATIENT INSTRUCTIONS
DR. HENLEY'S PREGNANCY SUPPORT    [facility] CHILDBIRTH & PARENTING EDUCATION (Virtual & By Appointment Services)  http://[email or web address].org/ann/health-and-wellness/pregnancy-resources/childbirth-and-parenting-education-programs  [phone]  Recommended programs include the Prepared Childbirth series, the Infant Care series, Boot Camp for New Dads, Car Seat Safety assessment, Friends & Family CPR, and Prenatal Tours. If planning to labor without an epidural, recommend the Spinning Babies® and Hypnobirthing® courses.    [facility] HEALTH NETWORK  https://www.[email or web address].[email or web address]/services/integrative-health-network/meet-the-team  [phone]  Network of acupuncturists, massage therapists, chiropractors, and integrative medicine specialists who assist with a variety of pregnancy-related concerns including but not limited to muscle or joint pain, breech presentation, and chronic pain conditions.    [facility] WOMEN'S MENTAL HEALTH CLINIC  https://[email or web address].org/services/psychiatry/conditions-treatments/womens-mental-health   [phone]  Specially trained team of mental health professionals who are experts in the treatment of anxiety, depression, bipolar illness, emotional trauma, and other mental conditions affecting pregnancy.    LOOKING FOR PROFESSIONAL BIRTH SUPPORT?  [employer] ( Certifying Organization)  https://www.fany.org/  Type in your zip code to find a certified birth and postpartum  near you    [facility]  http://[email or web address]/    CALM  SERVICES  [email or web address] Medical.[email or web address]    AILYN MA'AM  https://www.the Shelfam.Membersuite/    CLEBABY  https://www.clebaby.com    THE WOMB WELLNESS CENTER  http://www.[place] [facility].Membersuite/    NURTURED FOUNDATION   https://[email or web address]    [facility]  https://www.Personal Life Mediallagecle.org     FRUIT OF THE WOMB  SERVICES  https://  https://www.fruitofthewombirth.com     HELPFUL ONLINE RESOURCES   Evidence Based Birth ® website    Spinning Babies® website: “Flip a Breech,” “Engaging Baby in Labor,” “Three Levels of the Pelvis”    Saumya Lilly (JANETH-certified  and birth educator) videos on Youtube     Pregnancy and Postpartum TV videos (YouTube) - especially check out videos on diastasis recti, pelvic floor exercises, and pregnancy yoga for sciatica and low back pain     “How to Turn a Breech, Posterior or Transverse baby” - [employer] Mums [employer] (YouTube)    “How to do External Cephalic Version - Professional Version” - Merck Manuals (YouTube)    “False Labor vs.  True Labor” - [facility] (YouTube)    “The 8 Best Labor Positions for the First Phases of Childbirth” - [employer] (Youtube)    “ Section” - [employer] (YouTube)     [employer] breastfeeding videos (YouTube) - especially check out “Hand Expression” and “Breast Massage for Engorgement”     “How to Put on a Haaka Silicone Breastpump” - [employer] by Halie (Youtube)    DR. HENLEY'S GUIDE TO WRITING A BIRTH PLAN    A birth plan is a written statement of how a parent would like her labor and delivery to occur. Not every parent cares to make one, but a lot of parents do find the process of writing a birth plan useful.    I used to give patients a check-box style template to fill out, but I've found over time that such a document does not properly reflect the underlying goals of the birth plan, and can sometimes create unnecessary conflict between the parents and staff.    These days, I suggest you write a personalized birth plan. It should be relatively short (no more than 1-2 pages), polite, and help the care team understand you and your goals in a holistic way, highlighting any requests that are very important to you. Use positive rather than negative statements (e.g., “I would like to begin labor naturally” rather than “I do not want Pitocin.”).    Some  things to think about as you write your birth plan:  What would your ideal birth look like? How might you bring elements of that into your hospital birth? You don't have to necessarily put environmental “wants” into the birth plan document, but these are good things to discuss with your provider ahead of time, so you know what's allowed and what might be modified to meet your wishes.  What role will you play in achieving the birth you want? It is one thing to want to avoid a  section, and another to expect the medical team to control all the factors of your labor that might lead to a . How can the team support your work?  Some births will need medical intervention for the safety of the mom or baby. When such an intervention needs to occur, how would you like that interaction to look? What would make you feel safe and empowered when those choices must be made?  Do you have past experiences, good or bad, that influence your vision for this birth? If so, consider sharing some aspect of that with your care providers. Be specific about the details that will make a huge difference to you, especially if you might experience resentment or regret if those details are missed.  Who will speak for you if you're too tired, too sick, or too engrossed in labor? Let staff know ahead of time, so they don't assume someone is substituting their preferences for yours.  Your birth plan may be as simple as “Dear Birth Team: Please help me achieve the most natural birth possible. I so appreciate everyone helping us on this special day.”    Some things that parents commonly put in their birth plans are standard at [facility]. We allow a long labor before declaring it failed, we provide intermittent auscultation for medically qualified pregnancies, our episiotomy rate is <1%, we delay cord clamping and encourage skin-to-skin for healthy newborns. Your prenatal visits are a good time to learn more about the practice  patterns at this particular institution.    I encourage you to complete your birth plan at least 2-3 weeks before your expected delivery date, so we can discuss it before you arrive in the hospital.     We look forward to working with you in achieving a safe and memorable birth.    [facility] GUIDELINES FOR FETAL MONITORING    During your birth, it is important to ensure you and your baby are safe.   For this reason, some degree of fetal monitoring is always performed.     Some women may qualify for intermittent monitoring instead of continuous monitoring. This allows the woman to move around more during her labor.     To qualify for this, the woman must have a normal fetal monitoring result when she is first admitted to the hospital, AND be absent any high risk criteria (see below):   The use of labor-inducing medications (oxytocin, misoprostol, or Cervidil®)   Epidural analgesia   Fentanyl patient controlled analgesia   Meconium stained amniotic fluid   Previous  delivery   Diabetes, other than gestational diet-controlled   Hypertensive disorder   Intrauterine growth restriction   Multiple gestation   Low fluid   Gestation < 37.0 weeks   History of previous stillbirth   Unexplained vaginal bleeding   Maternal temp > 37.9 °C   Other known indication for continuous electronic fetal monitoring     If you are interested in intermittent monitoring, please let your provider know so we can discuss it in more detail.    [facility] GUIDELINES FOR NITROUS OXIDE  Nitrous oxide is a patient-administered pre-epidural pain relief option at [facility].  If you have any of the following conditions, unfortunately you will NOT be able to utilize nitrous oxide:  Inability to hold mask to face independent  Received IV narcotics within the last 2 hours  Impaired or intoxicated  Impaired oxygenation: cystic fibrosis or chronic lung disease, baseline O2 saturation <96%, history of sleep apnea  recommended for CPAP  Active COVID+   B12 deficiency  Conditions that include air in a closed body space: bowel obstruction, acute ear infection, acute pneumothorax, recent craniotomy, increased intracranial pressure, penetrating eye injury, retinal surgery  Hemodynamically unstable  Magnesium Sulfate administration  <35 weeks gestation  Epidural administration

## 2024-03-11 NOTE — PROGRESS NOTES
PLAN:  No problem-specific Assessment & Plan notes found for this encounter.    ASSESSESMENT:  1. Insufficient prenatal care in second trimester  Prenatal Screening Panel    Glucose, 1 Hour Screen, Pregnancy    Abo/Rh      2. Nausea and vomiting in pregnancy prior to 22 weeks gestation        3. Pelvic pain affecting pregnancy in second trimester, antepartum  Urine Culture      4. Vaginal odor  Vaginitis Gram Stain For Bacterial Vaginosis + Yeast      5. Yeast infection  terconazole (Terazol 7) 0.4 % vaginal cream          She is here for 24w3d OB visit.  Denies cramping, leaking of fluid, or bleeding   Indicates she has been feeling light headed and week. She is having vaginal discharge that has intermittent odor. Her left sided bartholins gland area has been swelling and painful.   Baby is moving well    PHYSICAL EXAM:   /70   Wt 70.8 kg (156 lb)   LMP 09/22/2023   BMI 23.72 kg/m²   I have reviewed her pertinent history, lab results, medications and problem list.  See Pregnancy episode for any changes.  Well appearing, Alert & oriented  Skin warm & dry  Normal range of motion in all extremities.    Abdomen soft  nontender  Normal resp effort    On assessment her left sided labia is slightly swollen, although not infectious appearing, and no particular cyst like structure or mass palpated. No exudate induced with pressure. Patient indicates that the pain comes and goes.    Instructed to apply warm compresses and if able to bring to a head for drainage.       Tdap next visit  prenatla panel ordered as it remains outstanding  Rh pos per history in chart  Repeat urine culture  Glucose ordered  Vaginitis culture sent      LEIGH Dawn   03/12/24    Follow up in about 4 weeks (around 4/8/2024) for PERNELL

## 2024-03-12 PROBLEM — O09.32 INSUFFICIENT PRENATAL CARE IN SECOND TRIMESTER (HHS-HCC): Status: ACTIVE | Noted: 2024-03-12

## 2024-03-12 LAB
CLUE CELLS VAG LPF-#/AREA: ABNORMAL /[LPF]
FERRITIN SERPL-MCNC: 8 NG/ML
FOLATE SERPL-MCNC: 23.9 NG/ML
HBV SURFACE AG SERPL QL IA: NONREACTIVE
HCV AB SER QL: NONREACTIVE
HIV 1+2 AB+HIV1 P24 AG SERPL QL IA: NONREACTIVE
IRON SATN MFR SERPL: 11 %
IRON SERPL-MCNC: 52 UG/DL
NUGENT SCORE: 1
REFLEX ADDED, ANEMIA PANEL: NORMAL
RUBV IGG SERPL IA-ACNC: 1.2 IA
RUBV IGG SERPL QL IA: POSITIVE
TIBC SERPL-MCNC: 472 UG/DL
TREPONEMA PALLIDUM IGG+IGM AB [PRESENCE] IN SERUM OR PLASMA BY IMMUNOASSAY: NONREACTIVE
UIBC SERPL-MCNC: 420 UG/DL
VIT B12 SERPL-MCNC: 273 PG/ML
YEAST VAG WET PREP-#/AREA: PRESENT

## 2024-03-12 RX ORDER — TERCONAZOLE 4 MG/G
1 CREAM VAGINAL NIGHTLY
Qty: 1 G | Refills: 0 | Status: SHIPPED | OUTPATIENT
Start: 2024-03-12 | End: 2024-03-23 | Stop reason: HOSPADM

## 2024-03-13 NOTE — ADDENDUM NOTE
Addended by: JORGE BRAXTON on: 3/12/2024 08:57 PM     Modules accepted: Orders, Level of Service

## 2024-03-23 ENCOUNTER — HOSPITAL ENCOUNTER (OUTPATIENT)
Facility: HOSPITAL | Age: 24
Setting detail: OBSERVATION
LOS: 1 days | Discharge: HOME | End: 2024-03-23
Attending: OBSTETRICS & GYNECOLOGY | Admitting: STUDENT IN AN ORGANIZED HEALTH CARE EDUCATION/TRAINING PROGRAM
Payer: COMMERCIAL

## 2024-03-23 ENCOUNTER — HOSPITAL ENCOUNTER (INPATIENT)
Facility: HOSPITAL | Age: 24
End: 2024-03-23
Attending: STUDENT IN AN ORGANIZED HEALTH CARE EDUCATION/TRAINING PROGRAM | Admitting: STUDENT IN AN ORGANIZED HEALTH CARE EDUCATION/TRAINING PROGRAM
Payer: COMMERCIAL

## 2024-03-23 VITALS
RESPIRATION RATE: 16 BRPM | SYSTOLIC BLOOD PRESSURE: 127 MMHG | DIASTOLIC BLOOD PRESSURE: 67 MMHG | HEIGHT: 68 IN | WEIGHT: 155.87 LBS | HEART RATE: 88 BPM | OXYGEN SATURATION: 100 % | BODY MASS INDEX: 23.62 KG/M2 | TEMPERATURE: 97.9 F

## 2024-03-23 DIAGNOSIS — R10.2 PELVIC PAIN AFFECTING PREGNANCY IN SECOND TRIMESTER, ANTEPARTUM (HHS-HCC): Primary | ICD-10-CM

## 2024-03-23 DIAGNOSIS — B37.9 YEAST INFECTION: ICD-10-CM

## 2024-03-23 DIAGNOSIS — O47.00 PRETERM CONTRACTIONS (HHS-HCC): ICD-10-CM

## 2024-03-23 DIAGNOSIS — O26.892 PELVIC PAIN AFFECTING PREGNANCY IN SECOND TRIMESTER, ANTEPARTUM (HHS-HCC): Primary | ICD-10-CM

## 2024-03-23 LAB
APTT PPP: 27 SECONDS (ref 27–38)
BILIRUBIN, POC: NEGATIVE
BLOOD URINE, POC: NEGATIVE
CLARITY, POC: CLEAR
CLUE CELLS SPEC QL WET PREP: PRESENT
COLOR, POC: YELLOW
ERYTHROCYTE [DISTWIDTH] IN BLOOD BY AUTOMATED COUNT: 12.9 % (ref 11.5–14.5)
FIBRINOGEN PPP-MCNC: 407 MG/DL (ref 200–400)
GLUCOSE URINE, POC: NEGATIVE
HCT VFR BLD AUTO: 30.3 % (ref 36–46)
HGB BLD-MCNC: 10.3 G/DL (ref 12–16)
INR PPP: 1 (ref 0.9–1.1)
KETONES, POC: NEGATIVE
LEUKOCYTE EST, POC: NORMAL
MCH RBC QN AUTO: 28.1 PG (ref 26–34)
MCHC RBC AUTO-ENTMCNC: 34 G/DL (ref 32–36)
MCV RBC AUTO: 83 FL (ref 80–100)
NITRITE, POC: NEGATIVE
NRBC BLD-RTO: 0 /100 WBCS (ref 0–0)
PH, POC: 5.5
PLATELET # BLD AUTO: 261 X10*3/UL (ref 150–450)
POC APPEARANCE OF BODY FLUID: NORMAL
PROTHROMBIN TIME: 11.3 SECONDS (ref 9.8–12.8)
RBC # BLD AUTO: 3.66 X10*6/UL (ref 4–5.2)
SPECIFIC GRAVITY, POC: 1.02
T VAGINALIS SPEC QL WET PREP: ABNORMAL
TRICHOMONAS REFLEX COMMENT: ABNORMAL
URINE PROTEIN, POC: NEGATIVE
UROBILINOGEN, POC: 0.2
WBC # BLD AUTO: 8.1 X10*3/UL (ref 4.4–11.3)
WBC VAG QL WET PREP: ABNORMAL
YEAST VAG QL WET PREP: ABNORMAL

## 2024-03-23 PROCEDURE — 87661 TRICHOMONAS VAGINALIS AMPLIF: CPT | Mod: 59 | Performed by: STUDENT IN AN ORGANIZED HEALTH CARE EDUCATION/TRAINING PROGRAM

## 2024-03-23 PROCEDURE — 85610 PROTHROMBIN TIME: CPT | Performed by: STUDENT IN AN ORGANIZED HEALTH CARE EDUCATION/TRAINING PROGRAM

## 2024-03-23 PROCEDURE — 99214 OFFICE O/P EST MOD 30 MIN: CPT | Performed by: STUDENT IN AN ORGANIZED HEALTH CARE EDUCATION/TRAINING PROGRAM

## 2024-03-23 PROCEDURE — 87800 DETECT AGNT MULT DNA DIREC: CPT | Performed by: STUDENT IN AN ORGANIZED HEALTH CARE EDUCATION/TRAINING PROGRAM

## 2024-03-23 PROCEDURE — 87086 URINE CULTURE/COLONY COUNT: CPT | Performed by: STUDENT IN AN ORGANIZED HEALTH CARE EDUCATION/TRAINING PROGRAM

## 2024-03-23 PROCEDURE — G0378 HOSPITAL OBSERVATION PER HR: HCPCS

## 2024-03-23 PROCEDURE — 87205 SMEAR GRAM STAIN: CPT | Performed by: STUDENT IN AN ORGANIZED HEALTH CARE EDUCATION/TRAINING PROGRAM

## 2024-03-23 PROCEDURE — 2500000001 HC RX 250 WO HCPCS SELF ADMINISTERED DRUGS (ALT 637 FOR MEDICARE OP): Performed by: STUDENT IN AN ORGANIZED HEALTH CARE EDUCATION/TRAINING PROGRAM

## 2024-03-23 PROCEDURE — 36415 COLL VENOUS BLD VENIPUNCTURE: CPT | Performed by: STUDENT IN AN ORGANIZED HEALTH CARE EDUCATION/TRAINING PROGRAM

## 2024-03-23 PROCEDURE — 36415 COLL VENOUS BLD VENIPUNCTURE: CPT

## 2024-03-23 PROCEDURE — 99215 OFFICE O/P EST HI 40 MIN: CPT

## 2024-03-23 PROCEDURE — 85027 COMPLETE CBC AUTOMATED: CPT | Performed by: STUDENT IN AN ORGANIZED HEALTH CARE EDUCATION/TRAINING PROGRAM

## 2024-03-23 PROCEDURE — 99285 EMERGENCY DEPT VISIT HI MDM: CPT

## 2024-03-23 PROCEDURE — 87210 SMEAR WET MOUNT SALINE/INK: CPT | Performed by: STUDENT IN AN ORGANIZED HEALTH CARE EDUCATION/TRAINING PROGRAM

## 2024-03-23 PROCEDURE — 1120000001 HC OB PRIVATE ROOM DAILY

## 2024-03-23 PROCEDURE — 85384 FIBRINOGEN ACTIVITY: CPT | Performed by: STUDENT IN AN ORGANIZED HEALTH CARE EDUCATION/TRAINING PROGRAM

## 2024-03-23 RX ORDER — FLUCONAZOLE 150 MG/1
150 TABLET ORAL ONCE
Qty: 1 TABLET | Refills: 0 | Status: SHIPPED | OUTPATIENT
Start: 2024-03-23 | End: 2024-03-23

## 2024-03-23 RX ORDER — ACETAMINOPHEN 325 MG/1
975 TABLET ORAL ONCE
Status: COMPLETED | OUTPATIENT
Start: 2024-03-23 | End: 2024-03-23

## 2024-03-23 RX ORDER — CYCLOBENZAPRINE HCL 10 MG
10 TABLET ORAL DAILY
Qty: 5 TABLET | Refills: 0 | Status: ON HOLD | OUTPATIENT
Start: 2024-03-23 | End: 2024-05-23

## 2024-03-23 RX ORDER — ACETAMINOPHEN 325 MG/1
1000 TABLET ORAL EVERY 6 HOURS PRN
Qty: 60 TABLET | Refills: 0 | Status: SHIPPED | OUTPATIENT
Start: 2024-03-23

## 2024-03-23 RX ORDER — CYCLOBENZAPRINE HCL 10 MG
5 TABLET ORAL ONCE
Status: COMPLETED | OUTPATIENT
Start: 2024-03-23 | End: 2024-03-23

## 2024-03-23 RX ADMIN — CYCLOBENZAPRINE HYDROCHLORIDE 5 MG: 10 TABLET, FILM COATED ORAL at 17:09

## 2024-03-23 RX ADMIN — ACETAMINOPHEN 975 MG: 325 TABLET ORAL at 17:09

## 2024-03-23 ASSESSMENT — COLUMBIA-SUICIDE SEVERITY RATING SCALE - C-SSRS
1. IN THE PAST MONTH, HAVE YOU WISHED YOU WERE DEAD OR WISHED YOU COULD GO TO SLEEP AND NOT WAKE UP?: NO
2. HAVE YOU ACTUALLY HAD ANY THOUGHTS OF KILLING YOURSELF?: NO
6. HAVE YOU EVER DONE ANYTHING, STARTED TO DO ANYTHING, OR PREPARED TO DO ANYTHING TO END YOUR LIFE?: NO

## 2024-03-23 ASSESSMENT — PAIN SCALES - GENERAL
PAINLEVEL_OUTOF10: 0 - NO PAIN
PAINLEVEL_OUTOF10: 7

## 2024-03-23 ASSESSMENT — PAIN - FUNCTIONAL ASSESSMENT: PAIN_FUNCTIONAL_ASSESSMENT: 0-10

## 2024-03-23 NOTE — ED PROVIDER NOTES
CC: Dizziness and Leakage/Loss of Fluid     HPI:  Patient a 23-year-old -0-1-1 that is approximately 26 weeks presenting to the emergency department with contractions reportedly every 2 minutes as well as leakage of fluid over the course of the past several days.  She is reporting lower abdominal cramping.  She is reporting in triage that she is concerned that she felt feet and therefore a code pink is immediately activated prior to my evaluation of the patient.    Patient reports contractions as well as slow leakage of fluid over the course the past several days as noted above, denies any active bleeding, reports prenatal care with recent appointment this past week.  Patient denies any additional medical concerns at this time.    Records Reviewed:  Recent available ED and inpatient notes reviewed in EMR.    PMHx/PSHx:  Per HPI.   - has a past medical history of Bartholin's cyst (10/31/2023), Goiter (10/31/2023), and Postinflammatory hyperpigmentation (2016).  - has a past surgical history that includes Bartholin gland cyst excision (Left, 2023).    Medications:  Reviewed in EMR. See EMR for complete list of medications and doses.    Allergies:  Latex and Other    Social History:  - Tobacco:  reports that she has never smoked. She has never used smokeless tobacco.   - Alcohol:  reports no history of alcohol use.   - Illicit Drugs:  reports no history of drug use.     ROS:  Per HPI.       ???????????????????????????????????????????????????????????????  Triage Vitals:  T 36.9 °C (98.5 °F)  HR 90  /63  RR 18  O2 100 % None (Room air)    Physical Exam  Vitals and nursing note reviewed.   Constitutional:       Appearance: Normal appearance.   HENT:      Head: Normocephalic and atraumatic.      Nose: Nose normal.      Mouth/Throat:      Pharynx: Oropharynx is clear.   Eyes:      Extraocular Movements: Extraocular movements intact.      Pupils: Pupils are equal, round, and reactive to light.    Cardiovascular:      Rate and Rhythm: Normal rate and regular rhythm.      Pulses: Normal pulses.   Pulmonary:      Effort: Pulmonary effort is normal.      Breath sounds: Normal breath sounds.   Abdominal:      General: There is no distension.      Tenderness: There is no abdominal tenderness. There is no right CVA tenderness, left CVA tenderness or guarding.   Genitourinary:     Comments: Cervix is less than 3 cm, minimally effaced on sterile cervical check  Musculoskeletal:         General: No swelling or deformity. Normal range of motion.      Cervical back: Normal range of motion.      Right lower leg: No edema.      Left lower leg: No edema.   Skin:     General: Skin is warm and dry.      Capillary Refill: Capillary refill takes less than 2 seconds.   Neurological:      General: No focal deficit present.      Mental Status: She is alert and oriented to person, place, and time.   Psychiatric:         Mood and Affect: Mood normal.         Behavior: Behavior normal.       ???????????????????????????????????????????????????????????????    Assessment and Plan:  Patient is 23-year-old female presenting to the emergency department with concern for active labor.  Patient reports feeling feet in the waiting room.  On my sterile cervical check fetal not appreciated, cervix is minimally open and the baby is not face.  Bedside ultrasound is performed which shows a heart rate of approximately 130 bpm for baby, and the baby in vertex position.  Basic labs and IV access are obtained, Code Pink is canceled his OB arrives and the patient is transported to labor and delivery for further care.  All questions are answered patient agreeable this plan and is transported in stable condition.    ED Course:  Diagnoses as of 24 0822    contractions        Social Determinants Limiting Care:      Disposition:  Transfer OB    Tao Miller MD       Procedures ? SmartLinks last updated 3/24/2024 8:22 AM        Tao MELISSA  MD Paul  Resident  03/24/24 [phone]

## 2024-03-23 NOTE — ED TRIAGE NOTES
"Pt to ED 28 weeks OB with 3 days of inconsistent contractions and loss of 1 cup of clear fluid 3 days ago with intermittent loss of small amounts of fluid. Also c/o dizziness, HA, lightheadness. Pt denies recent falls or injury.  Pt denies recent sick contacts.         Call to report to [facility] with contractions 2min apart in triage.     Pt reporting more loss of fluids during triage and reporting more loss of fluids and \"feeling feet.\" CODE PINK called to Rm 3 from Triage.       "

## 2024-03-23 NOTE — H&P
Triage History and Physical    Chief Complaint: Dizziness and Leakage/Loss of Fluid    Assessment/Plan    23y  at 26.1wga by LMP c/w 13w US presenting for contractions and LOF.     r/o PPROM + PTL  - SSE negative for rupture, ABEBA 12 on BSUS. Low concern for ROM.   - Copious thin white discharge noted. GC/CT and wet prep collected and sent.   - SVE 0.5/50/-3, unchanged on 2hr recheck  - No cx on toco, none by palpation  - Low concern for PTL based on lack of cervical change. However, patient reports continued cx. Will give tylenol, flexeril, and PO hydrate, and follow up response.     Maternal wellbeing  - No acute distress  - Vitals stable and WNL    Fetal Well-Being  - 1 spontaneous decel noted at 1500. 3 hours of category 1 tracing since.   - Will continue CEFM while monitoring symptoms. Fetal status overall reassuring given several hours of cat 1 tracing following decel, and normal ABEBA.     Dispo  - Signed out to night team for further management     Seen and d/w Dr. Alfa London MD PGY-4  Obstetrics and Gynecology     PM addendum: On exam, continuing to have abdominal discomfort; suprapubic and lower back pain elicited with palpation; no fundal tenderness or pain with lateral displacement of uterus. Slight improvement with Tylenol and Flexeril, no relief with heat packs. Urine dip obtained, notable for elevated specific gravity (1.020) and 1+ leukocytes, no nitrites; low suspicion for UTI, reflex urine culture sent; on BSUS, BPP 8/8 with ABEBA 12 on repeat. .8g (40.8%ile). SVE requested, found to be 0.5/50/-3. SSE notable for thin, white discharge concerning for yeast infection given endorsement of vulvar itching, fluconazole prescribed. On CEFM, spontaneous decel also noted at 1900 (in addition to decel at 1453 as above), otherwise Cat I tracing. SILVA Garcia RN emailed for sooner follow up appointment. Return precautions provided, discussed pregnancy belt and outpatient physical  therapy. Discharged with Flexeril, Tylenol and diflucan.     Leisa Polanco MD       Active Problems:  There are no active Hospital Problems.      Pregnancy Problems (from 23 to present)       Problem Noted Resolved    Insufficient prenatal care in second trimester 3/12/2024 by LEIGH Dawn No    Priority:  Medium      Overview Addendum 3/12/2024  8:51 PM by LEIGH Dawn     3/12/2024  Patient seen for initial prenatal visit at 5w5d, did present for anatomy scan. Next visit was at 24 weeks. Prenatal panel reordered at that visit with one hour glucose. Reviewed importance of keeping appointments. SGP         Anemia of mother in pregnancy, delivered with postpartum condition 3/12/2024 by LEIGH Dawn No    Priority:  Medium      Overview Addendum 3/12/2024  8:53 PM by LEIGH Dawn     3/12/2024  Second trimester h/h 10.1/29.9. iron BID sent to pharmacy. Plan to repeat labs in 2-3 weeks. SGP           Nausea and vomiting in pregnancy prior to 22 weeks gestation 2023 by Deanna Santoro MD No    Priority:  Medium      Overview Signed 2023  7:37 PM by Deanna Santoro MD     B6, Unisom, Zofran               Subjective   23y  at 26.1wga by LMP c/w 13w US presenting to the ED for contractions and LOF.     Code OB called to ED for c/f ROM and PTL. Checked by ED provided and thought to be 4cm, transferred up to L&D. On L&D, patient found to be resting comfortably. Reports episode of leaking clear fluid 3 days ago after using the bathroom. Reports intermittent leakage of white fluid since. Also feeling irregular contractions since this morning. Denies VB. Good FM.     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    Postinflammatory hyperpigmentation 2016        Past Surgical History   Past Surgical History:   Procedure Laterality Date    BARTHOLIN GLAND CYST EXCISION Left 2023       Social History  Social History     Tobacco Use    Smoking status: Never    Smokeless tobacco: Never   Substance Use Topics    Alcohol use: Never     Substance and Sexual Activity   Drug Use Never       Allergies  Latex and Other     Medications  Medications Prior to Admission   Medication Sig Dispense Refill Last Dose    doxylamine (Unisom, doxylamine,) 25 mg tablet Take 1 tablet (25 mg) by mouth as needed at bedtime for sleep or nausea. 30 tablet 0     ferrous fumarate-vitamin B12-vitamin C-folic acid (Foltrin) 110-0.5 mg capsule Take 1 capsule by mouth 2 times a day before meals. 60 capsule 11 Unknown    metoclopramide (Reglan) 10 mg tablet Take 1 tablet (10 mg) by mouth every 8 hours if needed (nausea and vomiting) for up to 7 days. 21 tablet 0     prenatal vitamin, iron-folic, 27 mg iron-800 mcg folic acid tablet Take 1 tablet by mouth once daily. 90 tablet 3 3/22/2024    [] terconazole (Terazol 7) 0.4 % vaginal cream Insert 1 applicator into the vagina once daily at bedtime for 7 days. 1 g 0        Objective    Last Vitals  Temp Pulse Resp BP MAP O2 Sat   36.6 °C (97.9 °F) 89 18 124/68   100 %     Physical Examination  GENERAL: Examination reveals a well developed, well nourished, gravid female in no acute distress. She is alert and cooperative.  HEENT: Sclera anicteric.  LUNGS:  normal resp effort   HEART:  HR wnl   ABDOMEN: soft, gravid, nontender, nondistended, no  "abnormal masses, no epigastric pain  FHT baseline 140s, mod shala, + accels. One spontaneous decel noted at 1500,   The fetus is in a vertex presentation, determined by ultrasound. ABEBA 12.   VAGINA:  SSE: moderate amount of thin white discharge, negative for pooling, nitrazine, and ferning   CERVIX: Fingertip cm dilated, 50 % effaced, -3 station; MEMBRANES are Intact  NEUROLOGICAL: alert, oriented, normal speech, no focal findings or movement disorder noted  PSYCHOLOGICAL: awake and alert; oriented to person, place, and time    Lab Review  No results found for: \"WBC\", \"HGB\", \"HCT\", \"PLT\"    "

## 2024-03-24 LAB
C TRACH RRNA SPEC QL NAA+PROBE: NEGATIVE
CLUE CELLS VAG LPF-#/AREA: NORMAL /[LPF]
N GONORRHOEA DNA SPEC QL PROBE+SIG AMP: NEGATIVE
NUGENT SCORE: 1
YEAST VAG WET PREP-#/AREA: NORMAL

## 2024-03-25 LAB — T VAGINALIS RRNA SPEC QL NAA+PROBE: NEGATIVE

## 2024-03-26 LAB — BACTERIA UR CULT: NORMAL

## 2024-04-08 ENCOUNTER — APPOINTMENT (OUTPATIENT)
Dept: OBSTETRICS AND GYNECOLOGY | Facility: CLINIC | Age: 24
End: 2024-04-08
Payer: COMMERCIAL

## 2024-04-09 ENCOUNTER — ROUTINE PRENATAL (OUTPATIENT)
Dept: OBSTETRICS AND GYNECOLOGY | Facility: CLINIC | Age: 24
End: 2024-04-09
Payer: COMMERCIAL

## 2024-04-09 VITALS — SYSTOLIC BLOOD PRESSURE: 110 MMHG | WEIGHT: 159.5 LBS | BODY MASS INDEX: 24.25 KG/M2 | DIASTOLIC BLOOD PRESSURE: 72 MMHG

## 2024-04-09 DIAGNOSIS — O21.9 NAUSEA AND VOMITING IN PREGNANCY PRIOR TO 22 WEEKS GESTATION (HHS-HCC): ICD-10-CM

## 2024-04-09 DIAGNOSIS — O09.32 INSUFFICIENT PRENATAL CARE IN SECOND TRIMESTER (HHS-HCC): ICD-10-CM

## 2024-04-09 PROBLEM — F50.89: Status: ACTIVE | Noted: 2024-04-09

## 2024-04-09 PROBLEM — F50.83: Status: ACTIVE | Noted: 2024-04-09

## 2024-04-09 PROCEDURE — 90471 IMMUNIZATION ADMIN: CPT | Performed by: ADVANCED PRACTICE MIDWIFE

## 2024-04-09 PROCEDURE — 0501F PRENATAL FLOW SHEET: CPT | Performed by: ADVANCED PRACTICE MIDWIFE

## 2024-04-09 PROCEDURE — 90715 TDAP VACCINE 7 YRS/> IM: CPT | Performed by: ADVANCED PRACTICE MIDWIFE

## 2024-04-09 RX ORDER — FERROUS SULFATE 220 (44)/5
SOLUTION, ORAL ORAL
Qty: 600 ML | Refills: 2 | Status: SHIPPED | OUTPATIENT
Start: 2024-04-09

## 2024-04-09 RX ORDER — TERCONAZOLE 4 MG/G
1 CREAM VAGINAL NIGHTLY
Qty: 1 G | Refills: 0 | Status: SHIPPED | OUTPATIENT
Start: 2024-04-09 | End: 2024-04-16

## 2024-04-09 NOTE — PATIENT INSTRUCTIONS
DR. HENLEY'S PREGNANCY SUPPORT    [facility] CHILDBIRTH & PARENTING EDUCATION (Virtual & By Appointment Services)  [email or web address]-[email or web address]  [phone]  Recommended programs include the Prepared Childbirth series, the Infant Care series, Boot Camp for New Dads, Car Seat Safety assessment, Friends & Family CPR, and Prenatal Tours. If planning to labor without an epidural, recommend the Spinning Babies® and Hypnobirthing® courses.    [facility] HEALTH NETWORK  [email or web address]-team  [phone]  Network of acupuncturists, massage therapists, chiropractors, and integrative medicine specialists who assist with a variety of pregnancy-related concerns including but not limited to muscle or joint pain, breech presentation, and chronic pain conditions.    [facility] WOMEN'S MENTAL HEALTH CLINIC  [email or web address]/conditions-[email or web address]/[email or web address]-mental-health   [phone]  Specially trained team of mental health professionals who are experts in the treatment of anxiety, depression, bipolar illness, emotional trauma, and other mental conditions affecting pregnancy.    LOOKING FOR PROFESSIONAL BIRTH SUPPORT?  [employer] ( Certifying Organization)  https://www.fany.org/  Type in your zip code to find a certified birth and postpartum  near you    [facility]  [email or web address]://[email or web address]    CALM  SERVICES  [email or web address]    AILYN MA'AM  [email or web address]://[email or web address]    CLEBABY  https://www.clebaby.com    THE WOMB WELLNESS CENTER  [email or web address]    NURTURED FOUNDATION   [email or web address]    [facility]  https://www.[email or web address] Teleservicesllagecle.org     FRUIT OF THE WOMB  SERVICES  https://  https://www.fruitofthewombirth.com     HELPFUL ONLINE RESOURCES   Evidence Based Birth ® website    Spinning Babies® website: “Flip a Breech,” “Engaging Baby in Labor,” “Three Levels of the Pelvis”    Saumya Lilly (JANETH-certified  and birth educator) videos on Youtube     Pregnancy and Postpartum TV videos (YouTube) - especially check out videos on diastasis recti, pelvic floor exercises, and pregnancy yoga for sciatica and low back pain     “How to Turn a Breech, Posterior or Transverse baby” - [employer] Mums Channel (YouTube)    “How to do External Cephalic Version - Professional Version” - Merck Manuals (YouTube)    “False Labor vs.  True Labor” - [facility] (YouTube)    “The 8 Best Labor Positions for the First Phases of Childbirth” - [employer] Bump (Youtube)    “ Section” - [employer] (YouTube)     [employer] breastfeeding videos (YouTube) - especially check out “Hand Expression” and “Breast Massage for Engorgement”     “How to Put on a Haaka Silicone Breastpump” - [employer] Life by Halie (Youtube)    DR. HENLEY'S GUIDE TO WRITING A BIRTH PLAN    A birth plan is a written statement of how a parent would like her labor and delivery to occur. Not every parent cares to make one, but a lot of parents do find the process of writing a birth plan useful.    I used to give patients a check-box style template to fill out, but I've found over time that such a document does not properly reflect the underlying goals of the birth plan, and can sometimes create unnecessary conflict between the parents and staff.    These days, I suggest you write a personalized birth plan. It should be relatively short (no more than 1-2 pages), polite, and help the care team understand you and your goals in a holistic way, highlighting any requests that are very important to you. Use positive rather than negative statements (e.g., “I would like to begin labor naturally” rather than “I do not want Pitocin.”).    Some  things to think about as you write your birth plan:  What would your ideal birth look like? How might you bring elements of that into your hospital birth? You don't have to necessarily put environmental “wants” into the birth plan document, but these are good things to discuss with your provider ahead of time, so you know what's allowed and what might be modified to meet your wishes.  What role will you play in achieving the birth you want? It is one thing to want to avoid a  section, and another to expect the medical team to control all the factors of your labor that might lead to a . How can the team support your work?  Some births will need medical intervention for the safety of the mom or baby. When such an intervention needs to occur, how would you like that interaction to look? What would make you feel safe and empowered when those choices must be made?  Do you have past experiences, good or bad, that influence your vision for this birth? If so, consider sharing some aspect of that with your care providers. Be specific about the details that will make a huge difference to you, especially if you might experience resentment or regret if those details are missed.  Who will speak for you if you're too tired, too sick, or too engrossed in labor? Let staff know ahead of time, so they don't assume someone is substituting their preferences for yours.  Your birth plan may be as simple as “Dear Birth Team: Please help me achieve the most natural birth possible. I so appreciate everyone helping us on this special day.”    Some things that parents commonly put in their birth plans are standard at [facility]. We allow a long labor before declaring it failed, we provide intermittent auscultation for medically qualified pregnancies, our episiotomy rate is <1%, we delay cord clamping and encourage skin-to-skin for healthy newborns. Your prenatal visits are a good time to learn more about the practice  patterns at this particular institution.    I encourage you to complete your birth plan at least 2-3 weeks before your expected delivery date, so we can discuss it before you arrive in the hospital.     We look forward to working with you in achieving a safe and memorable birth.    [facility] GUIDELINES FOR FETAL MONITORING    During your birth, it is important to ensure you and your baby are safe.   For this reason, some degree of fetal monitoring is always performed.     Some women may qualify for intermittent monitoring instead of continuous monitoring. This allows the woman to move around more during her labor.     To qualify for this, the woman must have a normal fetal monitoring result when she is first admitted to the hospital, AND be absent any high risk criteria (see below):   The use of labor-inducing medications (oxytocin, misoprostol, or Cervidil®)   Epidural analgesia   Fentanyl patient controlled analgesia   Meconium stained amniotic fluid   Previous  delivery   Diabetes, other than gestational diet-controlled   Hypertensive disorder   Intrauterine growth restriction   Multiple gestation   Low fluid   Gestation < 37.0 weeks   History of previous stillbirth   Unexplained vaginal bleeding   Maternal temp > 37.9 °C   Other known indication for continuous electronic fetal monitoring     If you are interested in intermittent monitoring, please let your provider know so we can discuss it in more detail.    [facility] GUIDELINES FOR NITROUS OXIDE  Nitrous oxide is a patient-administered pre-epidural pain relief option at [facility].  If you have any of the following conditions, unfortunately you will NOT be able to utilize nitrous oxide:  Inability to hold mask to face independent  Received IV narcotics within the last 2 hours  Impaired or intoxicated  Impaired oxygenation: cystic fibrosis or chronic lung disease, baseline O2 saturation <96%, history of sleep apnea  recommended for CPAP  Active COVID+   B12 deficiency  Conditions that include air in a closed body space: bowel obstruction, acute ear infection, acute pneumothorax, recent craniotomy, increased intracranial pressure, penetrating eye injury, retinal surgery  Hemodynamically unstable  Magnesium Sulfate administration  <35 weeks gestation  Epidural administration

## 2024-04-09 NOTE — PROGRESS NOTES
PLAN:  Anemia of mother in pregnancy, delivered with postpartum condition  Iron never went to pharmacy resent today,    ASSESSESMENT:  1. Nausea and vomiting in pregnancy prior to 22 weeks gestation        2. Insufficient prenatal care in second trimester        3. Anemia of mother in pregnancy, delivered with postpartum condition  ferrous sulfate 8.8 mg/mL elemental iron elixir    terconazole (Terazol 7) 0.4 % vaginal cream          She is here for 28w4d OB visit.  Denies cramping, leaking of fluid, or bleeding     Baby is moving well  PHYSICAL EXAM:   /72   Wt 72.3 kg (159 lb 8 oz)   LMP 09/22/2023   BMI 24.25 kg/m²   I have reviewed her pertinent history, lab results, medications and problem list.  See Pregnancy episode for any changes.  Well appearing, Alert & oriented  Skin warm & dry  Normal range of motion in all extremities.    Abdomen soft  nontender  Normal resp effort    Tdap today  Iron suspension sent to pharmacy - 88 mg bid  Tercanazole resent to pharmacy as patient did not receive    Reviewed importance of [facility]        Louise Dominguez, THERESA-TREMAYNE   04/09/24    No follow-ups on file.

## 2024-04-17 ENCOUNTER — ROUTINE PRENATAL (OUTPATIENT)
Dept: OBSTETRICS AND GYNECOLOGY | Facility: CLINIC | Age: 24
End: 2024-04-17
Payer: COMMERCIAL

## 2024-04-17 VITALS — DIASTOLIC BLOOD PRESSURE: 77 MMHG | BODY MASS INDEX: 24.94 KG/M2 | WEIGHT: 164 LBS | SYSTOLIC BLOOD PRESSURE: 116 MMHG

## 2024-04-17 DIAGNOSIS — Z87.59 HISTORY OF VACUUM EXTRACTION ASSISTED DELIVERY: ICD-10-CM

## 2024-04-17 DIAGNOSIS — O09.893 SHORT INTERVAL BETWEEN PREGNANCIES AFFECTING PREGNANCY IN THIRD TRIMESTER, ANTEPARTUM (HHS-HCC): ICD-10-CM

## 2024-04-17 DIAGNOSIS — O47.00 PRETERM UTERINE CONTRACTIONS (HHS-HCC): ICD-10-CM

## 2024-04-17 DIAGNOSIS — Z87.59 HISTORY OF GESTATIONAL HYPERTENSION: ICD-10-CM

## 2024-04-17 DIAGNOSIS — O99.013 ANEMIA AFFECTING PREGNANCY IN THIRD TRIMESTER (HHS-HCC): Primary | ICD-10-CM

## 2024-04-17 PROCEDURE — 0501F PRENATAL FLOW SHEET: CPT | Performed by: OBSTETRICS & GYNECOLOGY

## 2024-04-17 NOTE — PROGRESS NOTES
2nd tri labs notable only for anemia  Anemia: on Feosol elixir daily, which she prefers over a tablet. Repeat anemia labs.  Yeast - resolved after Terazol  Hx GHTN - BP nl today, pt never started bASA  SST - urine culture neg March 2024, repeat q-trimester    Notes she has ctx all the time, up to 5x an hour. Baby feels low to her.  No PTL on exam today, SVE 1.5/50/-3 but mostly feels multiparous.  However, given reports of occ LOF (neg SSE today), recommend growth scan to check for high leak and r/o oligo.    Pt recommended to schedule remainder of prenatal visits.  She prefers MD only care.    Deanna Santoro MD

## 2024-04-26 ENCOUNTER — HOSPITAL ENCOUNTER (OUTPATIENT)
Facility: HOSPITAL | Age: 24
Discharge: HOME | End: 2024-04-26
Attending: OBSTETRICS & GYNECOLOGY | Admitting: OBSTETRICS & GYNECOLOGY
Payer: COMMERCIAL

## 2024-04-26 ENCOUNTER — APPOINTMENT (OUTPATIENT)
Dept: RADIOLOGY | Facility: HOSPITAL | Age: 24
End: 2024-04-26
Payer: COMMERCIAL

## 2024-04-26 VITALS
HEART RATE: 79 BPM | SYSTOLIC BLOOD PRESSURE: 103 MMHG | OXYGEN SATURATION: 100 % | BODY MASS INDEX: 25.59 KG/M2 | WEIGHT: 168.87 LBS | TEMPERATURE: 97.9 F | DIASTOLIC BLOOD PRESSURE: 60 MMHG | RESPIRATION RATE: 18 BRPM | HEIGHT: 68 IN

## 2024-04-26 LAB
BASOPHILS # BLD AUTO: 0.03 X10*3/UL (ref 0–0.1)
BASOPHILS NFR BLD AUTO: 0.3 %
BILIRUBIN, POC: NEGATIVE
BLOOD URINE, POC: NEGATIVE
CLARITY, POC: CLEAR
COLOR, POC: YELLOW
EOSINOPHIL # BLD AUTO: 0.17 X10*3/UL (ref 0–0.7)
EOSINOPHIL NFR BLD AUTO: 1.7 %
ERYTHROCYTE [DISTWIDTH] IN BLOOD BY AUTOMATED COUNT: 14.1 % (ref 11.5–14.5)
GLUCOSE URINE, POC: NORMAL
HCT VFR BLD AUTO: 28.6 % (ref 36–46)
HGB BLD-MCNC: 9.1 G/DL (ref 12–16)
IMM GRANULOCYTES # BLD AUTO: 0.18 X10*3/UL (ref 0–0.7)
IMM GRANULOCYTES NFR BLD AUTO: 1.8 % (ref 0–0.9)
KETONES, POC: NEGATIVE
LEUKOCYTE EST, POC: NEGATIVE
LYMPHOCYTES # BLD AUTO: 1.63 X10*3/UL (ref 1.2–4.8)
LYMPHOCYTES NFR BLD AUTO: 16.1 %
MCH RBC QN AUTO: 25.6 PG (ref 26–34)
MCHC RBC AUTO-ENTMCNC: 31.8 G/DL (ref 32–36)
MCV RBC AUTO: 80 FL (ref 80–100)
MONOCYTES # BLD AUTO: 0.55 X10*3/UL (ref 0.1–1)
MONOCYTES NFR BLD AUTO: 5.4 %
NEUTROPHILS # BLD AUTO: 7.55 X10*3/UL (ref 1.2–7.7)
NEUTROPHILS NFR BLD AUTO: 74.7 %
NITRITE, POC: NEGATIVE
NRBC BLD-RTO: 0 /100 WBCS (ref 0–0)
PH, POC: 5.5
PLATELET # BLD AUTO: 241 X10*3/UL (ref 150–450)
POC APPEARANCE OF BODY FLUID: NORMAL
RBC # BLD AUTO: 3.56 X10*6/UL (ref 4–5.2)
SPECIFIC GRAVITY, POC: 1.02
URINE PROTEIN, POC: NEGATIVE
UROBILINOGEN, POC: 0.2
WBC # BLD AUTO: 10.1 X10*3/UL (ref 4.4–11.3)

## 2024-04-26 PROCEDURE — 59025 FETAL NON-STRESS TEST: CPT

## 2024-04-26 PROCEDURE — 99213 OFFICE O/P EST LOW 20 MIN: CPT

## 2024-04-26 PROCEDURE — 76815 OB US LIMITED FETUS(S): CPT

## 2024-04-26 PROCEDURE — 36415 COLL VENOUS BLD VENIPUNCTURE: CPT

## 2024-04-26 PROCEDURE — 2500000001 HC RX 250 WO HCPCS SELF ADMINISTERED DRUGS (ALT 637 FOR MEDICARE OP)

## 2024-04-26 PROCEDURE — 76817 TRANSVAGINAL US OBSTETRIC: CPT | Performed by: RADIOLOGY

## 2024-04-26 PROCEDURE — 76705 ECHO EXAM OF ABDOMEN: CPT

## 2024-04-26 PROCEDURE — 2500000004 HC RX 250 GENERAL PHARMACY W/ HCPCS (ALT 636 FOR OP/ED)

## 2024-04-26 PROCEDURE — 76815 OB US LIMITED FETUS(S): CPT | Performed by: RADIOLOGY

## 2024-04-26 PROCEDURE — 2500000005 HC RX 250 GENERAL PHARMACY W/O HCPCS

## 2024-04-26 PROCEDURE — 93975 VASCULAR STUDY: CPT

## 2024-04-26 PROCEDURE — 99214 OFFICE O/P EST MOD 30 MIN: CPT | Mod: 25

## 2024-04-26 PROCEDURE — 85025 COMPLETE CBC W/AUTO DIFF WBC: CPT

## 2024-04-26 RX ORDER — ONDANSETRON HYDROCHLORIDE 2 MG/ML
4 INJECTION, SOLUTION INTRAVENOUS ONCE
Status: COMPLETED | OUTPATIENT
Start: 2024-04-26 | End: 2024-04-26

## 2024-04-26 RX ORDER — ONDANSETRON 4 MG/1
4 TABLET, ORALLY DISINTEGRATING ORAL ONCE
Status: COMPLETED | OUTPATIENT
Start: 2024-04-26 | End: 2024-04-26

## 2024-04-26 RX ORDER — SUMATRIPTAN 50 MG/1
50 TABLET, FILM COATED ORAL ONCE
Status: COMPLETED | OUTPATIENT
Start: 2024-04-26 | End: 2024-04-26

## 2024-04-26 RX ORDER — METOCLOPRAMIDE 10 MG/1
10 TABLET ORAL ONCE
Status: COMPLETED | OUTPATIENT
Start: 2024-04-26 | End: 2024-04-26

## 2024-04-26 RX ORDER — CYCLOBENZAPRINE HCL 10 MG
10 TABLET ORAL ONCE
Status: COMPLETED | OUTPATIENT
Start: 2024-04-26 | End: 2024-04-26

## 2024-04-26 RX ORDER — DIPHENHYDRAMINE HCL 25 MG
25 CAPSULE ORAL ONCE
Status: COMPLETED | OUTPATIENT
Start: 2024-04-26 | End: 2024-04-26

## 2024-04-26 RX ORDER — ACETAMINOPHEN 325 MG/1
975 TABLET ORAL ONCE
Status: COMPLETED | OUTPATIENT
Start: 2024-04-26 | End: 2024-04-26

## 2024-04-26 RX ORDER — METOCLOPRAMIDE HYDROCHLORIDE 5 MG/ML
10 INJECTION INTRAMUSCULAR; INTRAVENOUS ONCE
Status: COMPLETED | OUTPATIENT
Start: 2024-04-26 | End: 2024-04-26

## 2024-04-26 RX ADMIN — SODIUM CHLORIDE, POTASSIUM CHLORIDE, SODIUM LACTATE AND CALCIUM CHLORIDE 1000 ML: 600; 310; 30; 20 INJECTION, SOLUTION INTRAVENOUS at 17:30

## 2024-04-26 RX ADMIN — ACETAMINOPHEN 975 MG: 325 TABLET ORAL at 12:58

## 2024-04-26 RX ADMIN — ONDANSETRON 4 MG: 4 TABLET, ORALLY DISINTEGRATING ORAL at 16:27

## 2024-04-26 RX ADMIN — CYCLOBENZAPRINE 10 MG: 10 TABLET, FILM COATED ORAL at 16:27

## 2024-04-26 RX ADMIN — SUMATRIPTAN SUCCINATE 50 MG: 50 TABLET ORAL at 15:53

## 2024-04-26 RX ADMIN — METOCLOPRAMIDE 10 MG: 10 TABLET ORAL at 12:58

## 2024-04-26 RX ADMIN — DIPHENHYDRAMINE HYDROCHLORIDE 25 MG: 25 CAPSULE ORAL at 12:58

## 2024-04-26 SDOH — SOCIAL STABILITY: SOCIAL INSECURITY: DOES ANYONE TRY TO KEEP YOU FROM HAVING/CONTACTING OTHER FRIENDS OR DOING THINGS OUTSIDE YOUR HOME?: NO

## 2024-04-26 SDOH — SOCIAL STABILITY: SOCIAL INSECURITY: ARE THERE ANY APPARENT SIGNS OF INJURIES/BEHAVIORS THAT COULD BE RELATED TO ABUSE/NEGLECT?: NO

## 2024-04-26 SDOH — SOCIAL STABILITY: SOCIAL INSECURITY: DO YOU FEEL ANYONE HAS EXPLOITED OR TAKEN ADVANTAGE OF YOU FINANCIALLY OR OF YOUR PERSONAL PROPERTY?: NO

## 2024-04-26 SDOH — HEALTH STABILITY: MENTAL HEALTH: WISH TO BE DEAD (PAST 1 MONTH): NO

## 2024-04-26 SDOH — SOCIAL STABILITY: SOCIAL INSECURITY: HAVE YOU HAD THOUGHTS OF HARMING ANYONE ELSE?: NO

## 2024-04-26 SDOH — SOCIAL STABILITY: SOCIAL INSECURITY: VERBAL ABUSE: DENIES

## 2024-04-26 SDOH — SOCIAL STABILITY: SOCIAL INSECURITY: ARE YOU OR HAVE YOU BEEN THREATENED OR ABUSED PHYSICALLY, EMOTIONALLY, OR SEXUALLY BY ANYONE?: NO

## 2024-04-26 SDOH — SOCIAL STABILITY: SOCIAL INSECURITY: PHYSICAL ABUSE: DENIES

## 2024-04-26 SDOH — HEALTH STABILITY: MENTAL HEALTH: NON-SPECIFIC ACTIVE SUICIDAL THOUGHTS (PAST 1 MONTH): NO

## 2024-04-26 SDOH — HEALTH STABILITY: MENTAL HEALTH: HAVE YOU USED ANY SUBSTANCES (CANABIS, COCAINE, HEROIN, HALLUCINOGENS, INHALANTS, ETC.) IN THE PAST 12 MONTHS?: NO

## 2024-04-26 SDOH — HEALTH STABILITY: MENTAL HEALTH: SUICIDAL BEHAVIOR (LIFETIME): NO

## 2024-04-26 SDOH — ECONOMIC STABILITY: HOUSING INSECURITY: DO YOU FEEL UNSAFE GOING BACK TO THE PLACE WHERE YOU ARE LIVING?: NO

## 2024-04-26 SDOH — HEALTH STABILITY: MENTAL HEALTH: HAVE YOU USED ANY PRESCRIPTION DRUGS OTHER THAN PRESCRIBED IN THE PAST 12 MONTHS?: NO

## 2024-04-26 SDOH — SOCIAL STABILITY: SOCIAL INSECURITY: HAVE YOU HAD ANY THOUGHTS OF HARMING ANYONE ELSE?: NO

## 2024-04-26 SDOH — SOCIAL STABILITY: SOCIAL INSECURITY: ABUSE SCREEN: ADULT

## 2024-04-26 SDOH — SOCIAL STABILITY: SOCIAL INSECURITY: HAS ANYONE EVER THREATENED TO HURT YOUR FAMILY OR YOUR PETS?: NO

## 2024-04-26 SDOH — HEALTH STABILITY: MENTAL HEALTH: WERE YOU ABLE TO COMPLETE ALL THE BEHAVIORAL HEALTH SCREENINGS?: YES

## 2024-04-26 ASSESSMENT — LIFESTYLE VARIABLES
SKIP TO QUESTIONS 9-10: 1
AUDIT-C TOTAL SCORE: 0
HOW OFTEN DO YOU HAVE 6 OR MORE DRINKS ON ONE OCCASION: NEVER
AUDIT-C TOTAL SCORE: 0
HOW OFTEN DO YOU HAVE A DRINK CONTAINING ALCOHOL: NEVER
HOW MANY STANDARD DRINKS CONTAINING ALCOHOL DO YOU HAVE ON A TYPICAL DAY: PATIENT DOES NOT DRINK

## 2024-04-26 ASSESSMENT — PAIN SCALES - GENERAL
PAINLEVEL_OUTOF10: 8
PAINLEVEL_OUTOF10: 7
PAINLEVEL_OUTOF10: 8
PAINLEVEL_OUTOF10: 7
PAINLEVEL_OUTOF10: 8

## 2024-04-26 ASSESSMENT — COLUMBIA-SUICIDE SEVERITY RATING SCALE - C-SSRS
6. HAVE YOU EVER DONE ANYTHING, STARTED TO DO ANYTHING, OR PREPARED TO DO ANYTHING TO END YOUR LIFE?: NO
2. HAVE YOU ACTUALLY HAD ANY THOUGHTS OF KILLING YOURSELF?: NO
1. IN THE PAST MONTH, HAVE YOU WISHED YOU WERE DEAD OR WISHED YOU COULD GO TO SLEEP AND NOT WAKE UP?: NO

## 2024-04-26 ASSESSMENT — PATIENT HEALTH QUESTIONNAIRE - PHQ9
1. LITTLE INTEREST OR PLEASURE IN DOING THINGS: NOT AT ALL
2. FEELING DOWN, DEPRESSED OR HOPELESS: NOT AT ALL
SUM OF ALL RESPONSES TO PHQ9 QUESTIONS 1 & 2: 0

## 2024-04-26 NOTE — H&P
Obstetrical TRIAGE History and Physical     Job Metz is a 23 y.o. . 31w0d by LMP here for abdominal pain and HA.     Chief Complaint: Headache, Contractions, and Abdominal Pain    Assessment/Plan    Headache  - hx of migraines, no hx of HDP  - with photophobia  - UA: SG 1.020   - tylenol, benadryl, reglan x1 without improvement   - Declined MRA/MRV   - Imitrex x1, 1LR bolus --> patient states HA resolved   - Patient endorses feeling much better, comfortable for dc  - Return precautions reviewed     RLQ Pain  - Afebrile, VSS  - x1 episode of blood in stool 2 days ago  - Tender to palpation without rebound tenderness, guarding or rigidity   - WBC 10.1, Hgb 9.1   - pelvis/abdomen US: Appendix not visualized, unremarkable appearance of ovaries  - tylenol -> no improvement -> flexeril x1   - Pt with emesis now in triage following imitrex -> zofran x1 -> resolved   - Patient declines CT abdomen/pelvis and desires dc home  - Low concern for acute or infectious process given exam, US, and labs  - Return precautions reviewed, patient verbalized understanding     IUP at 31w0d  - NST reactive  - Good fetal movement  - Continue routine prenatal care  - US on     Maternal Well-being  - Vital signs stable and WNL  - All questions and concerns addressed    Dispo:  - appropriate for discharge to home, patient comfortable with this   - follow-up with Dr. Santoro on  for routine prenatal visit   - return precautions reviewed     Discussed with Dr. Hughes, tracing reviewed, agrees with plan to d/c home.   Suzanna Padgett PA-C      Active Problems:  There are no active Hospital Problems.      Pregnancy Problems (from 23 to present)       Problem Noted Resolved    Anemia affecting pregnancy in third trimester ([facility]) 2024 by Deanna Santoro MD No    Priority:  Medium       uterine contractions ([facility]) 2024 by Deanna Santoro MD No    Priority:  Medium      Short interval between pregnancies  affecting pregnancy in third trimester, antepartum ([facility]) 4/17/2024 by Deanna Santoro MD No    Priority:  Medium      Pica, in adults 4/9/2024 by LEIGH Dawn No    Priority:  Medium      Overview Signed 4/9/2024  2:57 PM by LEIGH Dawn     4/9/2024  Patient ijndicates that she is eating cardboard very little bits every day. Will retest anemia panel. SGP         Insufficient prenatal care in second trimester ([facility]) 3/12/2024 by LEIGH Dawn No    Priority:  Medium      Overview Addendum 3/12/2024  8:51 PM by LEIGH Dawn     3/12/2024  Patient seen for initial prenatal visit at 5w5d, did present for anatomy scan. Next visit was at 24 weeks. Prenatal panel reordered at that visit with one hour glucose. Reviewed importance of keeping appointments. SGP         Anemia of mother in pregnancy, delivered with postpartum condition ([facility]) 3/12/2024 by LEIGH Dawn No    Priority:  Medium      Overview Addendum 3/12/2024  8:53 PM by LEIGH Dawn     3/12/2024  Second trimester h/h 10.1/29.9. iron BID sent to pharmacy. Plan to repeat labs in 2-3 weeks. SGP           Nausea and vomiting in pregnancy prior to 22 weeks gestation ([facility]) 11/2/2023 by Deanna Santoro MD No    Priority:  Medium      Overview Signed 11/2/2023  7:37 PM by Deanna Santoro MD     B6, Unisom, Zofran               Francis Kaiser is here complaining of headache and abdominal pain.  Denies vaginal bleeding., Denies leaking of fluid.      Patient endorses 9/10 HA for the past 3 days. She endorses hx of migraines and states this feels like one, with associated photophobia. She states she normally drinks water for headaches. She has not taken any medication. She feels the headache is due to lack of sleep related to abdominal pain.     She endorses 7/10 RLQ pain x3 days. She states it is a sharp or stretching pain which  worsens with laying flat occasionally. She endorses x1 episode of emesis yesterday, denies emesis or nausea today. She also endorses x1 episode of blood in stool 2 days ago. She denies hx of hemorrhoids or blood in stool.      She also endorses continued contractions for the past few weeks which have not increased in frequency or intensity. She also notes decreased FM for the past 3 days. She states she feels fetal movement but it is less often.      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 90 tablet 3 2024    acetaminophen (Tylenol) 325 mg tablet Take 3 tablets (975 mg) by mouth every 6 hours if needed for mild pain (1 - 3). 60 tablet 0 Unknown    cyclobenzaprine (Flexeril) 10 mg tablet Take 1 tablet (10 mg) by mouth once daily. 5 tablet 0 Unknown    doxylamine (Unisom, doxylamine,) 25 mg tablet Take 1 tablet (25 mg) by mouth as needed at bedtime for sleep or nausea. 30 tablet 0     ferrous fumarate-vitamin B12-vitamin  C-folic acid (Foltrin) 110-0.5 mg capsule Take 1 capsule by mouth 2 times a day before meals. 60 capsule 11 Unknown    ferrous sulfate 8.8 mg/mL elemental iron elixir Please take 10 ml orally twice daily 600 mL 2 Unknown    metoclopramide (Reglan) 10 mg tablet Take 1 tablet (10 mg) by mouth every 8 hours if needed (nausea and vomiting) for up to 7 days. 21 tablet 0        Objective    Last Vitals  Temp Pulse Resp BP MAP O2 Sat   36.6 °C (97.9 °F) 96 18 128/70   100 %     Physical Examination  GENERAL: Examination reveals a well developed, well nourished, gravid female in no acute distress. She is alert and cooperative.  HEENT: External ears normal. Nose normal, no erythema or discharge. Normocephalic and atraumatic   ABDOMEN: soft, gravid, nontender, nondistended, no abnormal masses, no epigastric pain  EXTREMITIES: no redness or tenderness in the calves or thighs, no edema  NEUROLOGICAL: alert, oriented, normal speech, no focal findings or movement disorder noted  PSYCHOLOGICAL: awake and alert; oriented to person, place, and time    Non-Stress Test   Baseline Fetal Heart Rate for Non-Stress Test: 120 BPM  Variability in Waveform for Non-Stress Test: Moderate  Accelerations in Non-Stress Test: Yes, lasting at least 15 seconds, greater than/equal to 15 bpm  Decelerations in Non-Stress Test: None  Contractions in Non-Stress Test: Not present  Interpretation of Non-Stress Test   Interpretation of Non-Stress Test: Reactive (appropriate for gestational age)    Lab Review  Lab Results   Component Value Date    WBC 10.1 04/26/2024    HGB 9.1 (L) 04/26/2024    HCT 28.6 (L) 04/26/2024     04/26/2024

## 2024-05-06 ENCOUNTER — APPOINTMENT (OUTPATIENT)
Dept: OBSTETRICS AND GYNECOLOGY | Facility: CLINIC | Age: 24
End: 2024-05-06
Payer: COMMERCIAL

## 2024-05-07 ENCOUNTER — APPOINTMENT (OUTPATIENT)
Dept: OBSTETRICS AND GYNECOLOGY | Facility: CLINIC | Age: 24
End: 2024-05-07
Payer: COMMERCIAL

## 2024-05-08 ENCOUNTER — CLINICAL SUPPORT (OUTPATIENT)
Dept: EMERGENCY MEDICINE | Facility: HOSPITAL | Age: 24
End: 2024-05-08
Payer: COMMERCIAL

## 2024-05-08 ENCOUNTER — HOSPITAL ENCOUNTER (OUTPATIENT)
Facility: HOSPITAL | Age: 24
Discharge: HOME | End: 2024-05-08
Attending: OBSTETRICS & GYNECOLOGY | Admitting: OBSTETRICS & GYNECOLOGY
Payer: COMMERCIAL

## 2024-05-08 VITALS
HEIGHT: 68 IN | OXYGEN SATURATION: 99 % | BODY MASS INDEX: 25.63 KG/M2 | TEMPERATURE: 97.2 F | WEIGHT: 169.09 LBS | DIASTOLIC BLOOD PRESSURE: 67 MMHG | HEART RATE: 85 BPM | RESPIRATION RATE: 16 BRPM | SYSTOLIC BLOOD PRESSURE: 115 MMHG

## 2024-05-08 LAB
ALBUMIN SERPL BCP-MCNC: 3.6 G/DL (ref 3.4–5)
ALP SERPL-CCNC: 108 U/L (ref 33–110)
ALT SERPL W P-5'-P-CCNC: 10 U/L (ref 7–45)
ANION GAP SERPL CALC-SCNC: 14 MMOL/L (ref 10–20)
AST SERPL W P-5'-P-CCNC: 14 U/L (ref 9–39)
BILIRUB SERPL-MCNC: 0.3 MG/DL (ref 0–1.2)
BUN SERPL-MCNC: 5 MG/DL (ref 6–23)
CALCIUM SERPL-MCNC: 8.9 MG/DL (ref 8.6–10.6)
CHLORIDE SERPL-SCNC: 104 MMOL/L (ref 98–107)
CLUE CELLS SPEC QL WET PREP: NORMAL
CO2 SERPL-SCNC: 23 MMOL/L (ref 21–32)
CREAT SERPL-MCNC: 0.44 MG/DL (ref 0.5–1.05)
CREAT UR-MCNC: 72.8 MG/DL (ref 20–320)
EGFRCR SERPLBLD CKD-EPI 2021: >90 ML/MIN/1.73M*2
ERYTHROCYTE [DISTWIDTH] IN BLOOD BY AUTOMATED COUNT: 15.1 % (ref 11.5–14.5)
GLUCOSE SERPL-MCNC: 65 MG/DL (ref 74–99)
HCT VFR BLD AUTO: 30.5 % (ref 36–46)
HGB BLD-MCNC: 9.8 G/DL (ref 12–16)
MCH RBC QN AUTO: 24.3 PG (ref 26–34)
MCHC RBC AUTO-ENTMCNC: 32.1 G/DL (ref 32–36)
MCV RBC AUTO: 76 FL (ref 80–100)
NRBC BLD-RTO: 0 /100 WBCS (ref 0–0)
PLATELET # BLD AUTO: 267 X10*3/UL (ref 150–450)
POC APPEARANCE, URINE: CLEAR
POC BILIRUBIN, URINE: NEGATIVE
POC BLOOD, URINE: NEGATIVE
POC COLOR, URINE: YELLOW
POC GLUCOSE, URINE: NEGATIVE MG/DL
POC KETONES, URINE: NEGATIVE MG/DL
POC LEUKOCYTES, URINE: ABNORMAL
POC NITRITE,URINE: NEGATIVE
POC PH, URINE: 7 PH
POC PROTEIN, URINE: NEGATIVE MG/DL
POC SPECIFIC GRAVITY, URINE: 1.02
POC UROBILINOGEN, URINE: 0.2 EU/DL
POTASSIUM SERPL-SCNC: 4.3 MMOL/L (ref 3.5–5.3)
PROT SERPL-MCNC: 6.7 G/DL (ref 6.4–8.2)
PROT UR-ACNC: 9 MG/DL (ref 5–24)
PROT/CREAT UR: 0.12 MG/MG CREAT (ref 0–0.17)
RBC # BLD AUTO: 4.04 X10*6/UL (ref 4–5.2)
SODIUM SERPL-SCNC: 137 MMOL/L (ref 136–145)
T VAGINALIS SPEC QL WET PREP: NORMAL
WBC # BLD AUTO: 11.1 X10*3/UL (ref 4.4–11.3)
WBC VAG QL WET PREP: NORMAL
YEAST VAG QL WET PREP: NORMAL

## 2024-05-08 PROCEDURE — 93005 ELECTROCARDIOGRAM TRACING: CPT | Mod: 59

## 2024-05-08 PROCEDURE — 87210 SMEAR WET MOUNT SALINE/INK: CPT | Performed by: FAMILY MEDICINE

## 2024-05-08 PROCEDURE — 59025 FETAL NON-STRESS TEST: CPT | Performed by: FAMILY MEDICINE

## 2024-05-08 PROCEDURE — 2500000001 HC RX 250 WO HCPCS SELF ADMINISTERED DRUGS (ALT 637 FOR MEDICARE OP): Performed by: FAMILY MEDICINE

## 2024-05-08 PROCEDURE — 36415 COLL VENOUS BLD VENIPUNCTURE: CPT

## 2024-05-08 PROCEDURE — 99213 OFFICE O/P EST LOW 20 MIN: CPT | Performed by: FAMILY MEDICINE

## 2024-05-08 PROCEDURE — 2500000004 HC RX 250 GENERAL PHARMACY W/ HCPCS (ALT 636 FOR OP/ED): Performed by: FAMILY MEDICINE

## 2024-05-08 PROCEDURE — 82570 ASSAY OF URINE CREATININE: CPT | Performed by: FAMILY MEDICINE

## 2024-05-08 PROCEDURE — 85027 COMPLETE CBC AUTOMATED: CPT | Performed by: FAMILY MEDICINE

## 2024-05-08 PROCEDURE — 36415 COLL VENOUS BLD VENIPUNCTURE: CPT | Performed by: FAMILY MEDICINE

## 2024-05-08 PROCEDURE — 99215 OFFICE O/P EST HI 40 MIN: CPT | Mod: 25

## 2024-05-08 PROCEDURE — 80053 COMPREHEN METABOLIC PANEL: CPT | Performed by: FAMILY MEDICINE

## 2024-05-08 PROCEDURE — 81002 URINALYSIS NONAUTO W/O SCOPE: CPT | Performed by: FAMILY MEDICINE

## 2024-05-08 RX ORDER — LIDOCAINE HYDROCHLORIDE 10 MG/ML
0.5 INJECTION INFILTRATION; PERINEURAL ONCE AS NEEDED
Status: DISCONTINUED | OUTPATIENT
Start: 2024-05-08 | End: 2024-05-08 | Stop reason: HOSPADM

## 2024-05-08 RX ORDER — LABETALOL HYDROCHLORIDE 5 MG/ML
20 INJECTION, SOLUTION INTRAVENOUS ONCE AS NEEDED
Status: DISCONTINUED | OUTPATIENT
Start: 2024-05-08 | End: 2024-05-08 | Stop reason: HOSPADM

## 2024-05-08 RX ORDER — NIFEDIPINE 10 MG/1
10 CAPSULE ORAL ONCE AS NEEDED
Status: DISCONTINUED | OUTPATIENT
Start: 2024-05-08 | End: 2024-05-08 | Stop reason: HOSPADM

## 2024-05-08 RX ORDER — HYDRALAZINE HYDROCHLORIDE 20 MG/ML
5 INJECTION INTRAMUSCULAR; INTRAVENOUS ONCE AS NEEDED
Status: DISCONTINUED | OUTPATIENT
Start: 2024-05-08 | End: 2024-05-08 | Stop reason: HOSPADM

## 2024-05-08 RX ORDER — CYCLOBENZAPRINE HCL 10 MG
10 TABLET ORAL ONCE
Status: COMPLETED | OUTPATIENT
Start: 2024-05-08 | End: 2024-05-08

## 2024-05-08 RX ADMIN — IRON SUCROSE 200 MG: 20 INJECTION, SOLUTION INTRAVENOUS at 13:29

## 2024-05-08 RX ADMIN — CYCLOBENZAPRINE 10 MG: 10 TABLET, FILM COATED ORAL at 13:29

## 2024-05-08 SDOH — HEALTH STABILITY: MENTAL HEALTH: NON-SPECIFIC ACTIVE SUICIDAL THOUGHTS (PAST 1 MONTH): NO

## 2024-05-08 SDOH — SOCIAL STABILITY: SOCIAL INSECURITY: ARE YOU OR HAVE YOU BEEN THREATENED OR ABUSED PHYSICALLY, EMOTIONALLY, OR SEXUALLY BY ANYONE?: NO

## 2024-05-08 SDOH — HEALTH STABILITY: MENTAL HEALTH: WISH TO BE DEAD (PAST 1 MONTH): NO

## 2024-05-08 SDOH — SOCIAL STABILITY: SOCIAL INSECURITY: VERBAL ABUSE: DENIES

## 2024-05-08 SDOH — SOCIAL STABILITY: SOCIAL INSECURITY: HAS ANYONE EVER THREATENED TO HURT YOUR FAMILY OR YOUR PETS?: NO

## 2024-05-08 SDOH — SOCIAL STABILITY: SOCIAL INSECURITY: HAVE YOU HAD THOUGHTS OF HARMING ANYONE ELSE?: NO

## 2024-05-08 SDOH — HEALTH STABILITY: MENTAL HEALTH: HAVE YOU USED ANY PRESCRIPTION DRUGS OTHER THAN PRESCRIBED IN THE PAST 12 MONTHS?: NO

## 2024-05-08 SDOH — SOCIAL STABILITY: SOCIAL INSECURITY: ABUSE SCREEN: ADULT

## 2024-05-08 SDOH — SOCIAL STABILITY: SOCIAL INSECURITY: PHYSICAL ABUSE: DENIES

## 2024-05-08 SDOH — SOCIAL STABILITY: SOCIAL INSECURITY: ARE THERE ANY APPARENT SIGNS OF INJURIES/BEHAVIORS THAT COULD BE RELATED TO ABUSE/NEGLECT?: NO

## 2024-05-08 SDOH — HEALTH STABILITY: MENTAL HEALTH: WERE YOU ABLE TO COMPLETE ALL THE BEHAVIORAL HEALTH SCREENINGS?: YES

## 2024-05-08 SDOH — ECONOMIC STABILITY: HOUSING INSECURITY: DO YOU FEEL UNSAFE GOING BACK TO THE PLACE WHERE YOU ARE LIVING?: NO

## 2024-05-08 SDOH — HEALTH STABILITY: MENTAL HEALTH: SUICIDAL BEHAVIOR (LIFETIME): NO

## 2024-05-08 SDOH — HEALTH STABILITY: MENTAL HEALTH: HAVE YOU USED ANY SUBSTANCES (CANABIS, COCAINE, HEROIN, HALLUCINOGENS, INHALANTS, ETC.) IN THE PAST 12 MONTHS?: NO

## 2024-05-08 SDOH — SOCIAL STABILITY: SOCIAL INSECURITY: DOES ANYONE TRY TO KEEP YOU FROM HAVING/CONTACTING OTHER FRIENDS OR DOING THINGS OUTSIDE YOUR HOME?: NO

## 2024-05-08 SDOH — SOCIAL STABILITY: SOCIAL INSECURITY: DO YOU FEEL ANYONE HAS EXPLOITED OR TAKEN ADVANTAGE OF YOU FINANCIALLY OR OF YOUR PERSONAL PROPERTY?: NO

## 2024-05-08 ASSESSMENT — LIFESTYLE VARIABLES
AUDIT-C TOTAL SCORE: 0
HOW OFTEN DO YOU HAVE 6 OR MORE DRINKS ON ONE OCCASION: NEVER
AUDIT-C TOTAL SCORE: 0
SKIP TO QUESTIONS 9-10: 1
HOW MANY STANDARD DRINKS CONTAINING ALCOHOL DO YOU HAVE ON A TYPICAL DAY: PATIENT DOES NOT DRINK
HOW OFTEN DO YOU HAVE A DRINK CONTAINING ALCOHOL: NEVER

## 2024-05-08 ASSESSMENT — COLUMBIA-SUICIDE SEVERITY RATING SCALE - C-SSRS
1. IN THE PAST MONTH, HAVE YOU WISHED YOU WERE DEAD OR WISHED YOU COULD GO TO SLEEP AND NOT WAKE UP?: NO
6. HAVE YOU EVER DONE ANYTHING, STARTED TO DO ANYTHING, OR PREPARED TO DO ANYTHING TO END YOUR LIFE?: NO
2. HAVE YOU ACTUALLY HAD ANY THOUGHTS OF KILLING YOURSELF?: NO

## 2024-05-08 ASSESSMENT — PAIN - FUNCTIONAL ASSESSMENT: PAIN_FUNCTIONAL_ASSESSMENT: 0-10

## 2024-05-08 ASSESSMENT — PATIENT HEALTH QUESTIONNAIRE - PHQ9
2. FEELING DOWN, DEPRESSED OR HOPELESS: NOT AT ALL
SUM OF ALL RESPONSES TO PHQ9 QUESTIONS 1 & 2: 0
1. LITTLE INTEREST OR PLEASURE IN DOING THINGS: NOT AT ALL

## 2024-05-08 ASSESSMENT — PAIN SCALES - GENERAL: PAINLEVEL_OUTOF10: 0 - NO PAIN

## 2024-05-08 NOTE — ED TRIAGE NOTES
37 weeks pregnant coming in for dizziness and contractions that are 4-5 minutes apart. Patient states she was being worked up for pre-eclampsia recently. Patient states part of her mucous plug fell out

## 2024-05-08 NOTE — H&P
Obstetrical TRIAGE History and Physical     Job Metz is a 23 y.o. . 32w5d dated by LMP c/w 16w US on 2024. Receives prenatal care at [facility]      Chief Complaint: Pregnancy Problem    Assessment/Plan        R/o PTL  - increasingly painful, yet irregular (q10min) ctx started yesterday evening -> Flexeril -> reports some improvement in discomfort  - few, irregular ctx seen on TOCO  - SVE /-3, unchanged from office visit on   - SSE negative x3  - Low concern for PTL at this time  - wet prep negative  - discussed PTL, FMCs, warning signs, when to return for eval, pt verbalized understanding    Lightheadedness, anemia, pica  - intermittent lightheadedness with position changes  - orthostatic VS negative   - EKG NSR  - symptomatic anemia -> developed pica, eating cardboard  - previously on PO Fe but self discontinued d/t no effect  - last HMG 9.1 on , 9.8 today   - s/p IV Fe 200mg given in triage, will schedule additional doses    H/o gHTN  - normotensive during this pregnancy and normotensive during time in triage   - no home meds, not checking BP at home  - baseline HELLP labs neg, P:C 0.12    Maternal Well Being  - No acute distress  - Vitals stable and WNL    Fetal Well-Being  - NST reactive  - good fetal movement    Dispo  - Safe and stable for d/c to home  - Follow-up with OB provider as scheduled on       Discussed plan and reviewed tracing with Dr. Isrrael Renee, APRN-CNP      Active Problems:  There are no active Hospital Problems.      Pregnancy Problems (from 22 to present)       Problem Noted Resolved    Anemia affecting pregnancy in third trimester ([facility]) 2024 by Deanna Santoro MD No    Priority:  Medium       uterine contractions ([facility]) 2024 by Deanna Santoro MD No    Priority:  Medium      Short interval between pregnancies affecting pregnancy in third trimester, antepartum ([facility]) 2024 by Deanna Santoro MD No    Priority:  Medium   "    Pica, in adults 4/9/2024 by LEIGH Dawn No    Priority:  Medium      Overview Signed 4/9/2024  2:57 PM by LEIGH Dawn     4/9/2024  Patient ijndicates that she is eating cardboard very little bits every day. Will retest anemia panel. SGP         Insufficient prenatal care in second trimester ([facility]) 3/12/2024 by LEIGH Dawn No    Priority:  Medium      Overview Addendum 3/12/2024  8:51 PM by LEIGH Dawn     3/12/2024  Patient seen for initial prenatal visit at 5w5d, did present for anatomy scan. Next visit was at 24 weeks. Prenatal panel reordered at that visit with one hour glucose. Reviewed importance of keeping appointments. SGP         Anemia of mother in pregnancy, delivered with postpartum condition ([facility]) 3/12/2024 by LEIGH Dawn No    Priority:  Medium      Overview Addendum 3/12/2024  8:53 PM by LEIGH Dawn     3/12/2024  Second trimester h/h 10.1/29.9. iron BID sent to pharmacy. Plan to repeat labs in 2-3 weeks. SGP           Nausea and vomiting in pregnancy prior to 22 weeks gestation ([facility]) 11/2/2023 by Deanna Santoro MD No    Priority:  Medium      Overview Signed 11/2/2023  7:37 PM by Deanna Santoro MD     B6, Unisom, Zofran               Subjective   Job is here for r/o PTL and lightheadedness.    Started to have more frequent and increasingly painful ctx yesterday evening, q10min. States she also \"may have lost part of my mucus plug\" this AM, with continual trickling of fluid throughout the day. States clear fluid on panty liner, no vaginal bleeding. Having good fetal movement.    Also reports intermittent lightheadedness with position changes. Known anemia, last HMG 9.1 on 4/26. Was previously on PO Fe but self discontinued d/t no effect on pica (eating cardboard), with plan to schedule IV Fe infusion but has not yet scheduled. Reports good fluid " intake, no N/V.    H/o gHTN in previous pregnancy - has been normotensive during this pregnancy thus far. Denies CP, SOB, RUQ pain, LE edema, HA, vision changes. Was to have baseline HELLP, P:C but has not yet completed. Will collect baseline HELLP labs today for monitoring.       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 90 tablet 3 2024    acetaminophen (Tylenol) 325 mg tablet Take 3 tablets (975 mg) by mouth every 6 hours if needed for mild pain (1 - 3). 60 tablet 0     cyclobenzaprine (Flexeril) 10 mg tablet Take 1 tablet (10 mg) by mouth once daily. 5 tablet 0     doxylamine (Unisom, doxylamine,) 25 mg tablet Take 1 tablet (25 mg) by mouth as needed at bedtime for sleep or nausea. 30 tablet 0     ferrous fumarate-vitamin B12-vitamin C-folic acid (Foltrin) 110-0.5 mg capsule Take 1 capsule by mouth 2 times a day before meals. 60 capsule 11 Unknown    ferrous sulfate 8.8 mg/mL elemental iron elixir Please take 10 ml  orally twice daily 600 mL 2 Unknown    metoclopramide (Reglan) 10 mg tablet Take 1 tablet (10 mg) by mouth every 8 hours if needed (nausea and vomiting) for up to 7 days. 21 tablet 0        Objective    Last Vitals  Temp Pulse Resp BP MAP O2 Sat   36.2 °C (97.2 °F) 87 16 117/68 (Simultaneous filing. User may not have seen previous data.)   96 %       Physical Exam  Constitutional:       Appearance: Normal appearance.   HENT:      Head: Normocephalic.   Cardiovascular:      Rate and Rhythm: Normal rate.   Pulmonary:      Effort: Pulmonary effort is normal.   Abdominal:      Palpations: Abdomen is soft.      Tenderness: There is no abdominal tenderness.   Genitourinary:     Comments: , mod variability, +accels, -decels  SSE: neg x3  SVE: 1/50/-3  Skin:     General: Skin is warm and dry.   Neurological:      Mental Status: She is alert and oriented to person, place, and time.   Psychiatric:         Mood and Affect: Mood normal.         Behavior: Behavior normal.       Lab Review  Admission on 05/08/2024   Component Date Value Ref Range Status    POC Color, Urine 05/08/2024 Yellow  Straw, Yellow, Light-Yellow Final    POC Appearance, Urine 05/08/2024 Clear  Clear Final    POC Glucose, Urine 05/08/2024 NEGATIVE  NEGATIVE mg/dl Final    POC Bilirubin, Urine 05/08/2024 NEGATIVE  NEGATIVE Final    POC Ketones, Urine 05/08/2024 NEGATIVE  NEGATIVE mg/dl Final    POC Specific Gravity, Urine 05/08/2024 1.020  1.005 - 1.035 Final    POC Blood, Urine 05/08/2024 NEGATIVE  NEGATIVE Final    POC PH, Urine 05/08/2024 7.0  No Reference Range Established PH Final    POC Protein, Urine 05/08/2024 NEGATIVE  NEGATIVE, 30 (1+) mg/dl Final    POC Urobilinogen, Urine 05/08/2024 0.2  0.2, 1.0 EU/DL Final    Poc Nitrite, Urine 05/08/2024 NEGATIVE  NEGATIVE Final    POC Leukocytes, Urine 05/08/2024 TRACE (A)  NEGATIVE Final    Trichomonas 05/08/2024 None Seen  None Seen Final    Clue Cells 05/08/2024 None Seen  None Seen Final    Yeast  05/08/2024 None Seen  None Seen Final    WBC 05/08/2024 3-8   Final    Total Protein, Urine Random 05/08/2024 9  5 - 24 mg/dL Final    Creatinine, Urine Random 05/08/2024 72.8  20.0 - 320.0 mg/dL Final    T. Protein/Creatinine Ratio 05/08/2024 0.12  0.00 - 0.17 mg/mg Creat Final    WBC 05/08/2024 11.1  4.4 - 11.3 x10*3/uL Final    nRBC 05/08/2024 0.0  0.0 - 0.0 /100 WBCs Final    RBC 05/08/2024 4.04  4.00 - 5.20 x10*6/uL Final    Hemoglobin 05/08/2024 9.8 (L)  12.0 - 16.0 g/dL Final    Hematocrit 05/08/2024 30.5 (L)  36.0 - 46.0 % Final    MCV 05/08/2024 76 (L)  80 - 100 fL Final    MCH 05/08/2024 24.3 (L)  26.0 - 34.0 pg Final    MCHC 05/08/2024 32.1  32.0 - 36.0 g/dL Final    RDW 05/08/2024 15.1 (H)  11.5 - 14.5 % Final    Platelets 05/08/2024 267  150 - 450 x10*3/uL Final    Glucose 05/08/2024 65 (L)  74 - 99 mg/dL Final    Sodium 05/08/2024 137  136 - 145 mmol/L Final    Potassium 05/08/2024 4.3  3.5 - 5.3 mmol/L Final    Chloride 05/08/2024 104  98 - 107 mmol/L Final    Bicarbonate 05/08/2024 23  21 - 32 mmol/L Final    Anion Gap 05/08/2024 14  10 - 20 mmol/L Final    Urea Nitrogen 05/08/2024 5 (L)  6 - 23 mg/dL Final    Creatinine 05/08/2024 0.44 (L)  0.50 - 1.05 mg/dL Final    eGFR 05/08/2024 >90  >60 mL/min/1.73m*2 Final    Calculations of estimated GFR are performed using the 2021 CKD-EPI Study Refit equation without the race variable for the IDMS-Traceable creatinine methods.  https://jasn.asnjournals.org/content/early/2021/09/22/ASN.7742847737    Calcium 05/08/2024 8.9  8.6 - 10.6 mg/dL Final    Albumin 05/08/2024 3.6  3.4 - 5.0 g/dL Final    Alkaline Phosphatase 05/08/2024 108  33 - 110 U/L Final    Total Protein 05/08/2024 6.7  6.4 - 8.2 g/dL Final    AST 05/08/2024 14  9 - 39 U/L Final    Bilirubin, Total 05/08/2024 0.3  0.0 - 1.2 mg/dL Final    ALT 05/08/2024 10  7 - 45 U/L Final    Patients treated with Sulfasalazine may generate falsely decreased results for ALT.

## 2024-05-09 LAB
ATRIAL RATE: 95 BPM
P AXIS: 86 DEGREES
P OFFSET: 195 MS
P ONSET: 151 MS
PR INTERVAL: 138 MS
Q ONSET: 220 MS
QRS COUNT: 16 BEATS
QRS DURATION: 72 MS
QT INTERVAL: 358 MS
QTC CALCULATION(BAZETT): 449 MS
QTC FREDERICIA: 417 MS
R AXIS: 78 DEGREES
T AXIS: 33 DEGREES
T OFFSET: 399 MS
VENTRICULAR RATE: 95 BPM

## 2024-05-11 ENCOUNTER — HOSPITAL ENCOUNTER (OUTPATIENT)
Facility: HOSPITAL | Age: 24
Discharge: HOME | End: 2024-05-11
Attending: STUDENT IN AN ORGANIZED HEALTH CARE EDUCATION/TRAINING PROGRAM | Admitting: STUDENT IN AN ORGANIZED HEALTH CARE EDUCATION/TRAINING PROGRAM
Payer: COMMERCIAL

## 2024-05-11 VITALS
OXYGEN SATURATION: 100 % | TEMPERATURE: 97.3 F | BODY MASS INDEX: 26.1 KG/M2 | HEART RATE: 73 BPM | SYSTOLIC BLOOD PRESSURE: 116 MMHG | WEIGHT: 172.18 LBS | RESPIRATION RATE: 18 BRPM | HEIGHT: 68 IN | DIASTOLIC BLOOD PRESSURE: 71 MMHG

## 2024-05-11 LAB
BILIRUBIN, POC: NEGATIVE
BLOOD URINE, POC: NEGATIVE
CLARITY, POC: CLEAR
COLOR, POC: YELLOW
GLUCOSE URINE, POC: ABNORMAL
KETONES, POC: NEGATIVE
LEUKOCYTE EST, POC: NEGATIVE
NITRITE, POC: NEGATIVE
PH, POC: 5
POC APPEARANCE OF BODY FLUID: CLEAR
SPECIFIC GRAVITY, POC: 1.02
URINE PROTEIN, POC: NEGATIVE
UROBILINOGEN, POC: 0.2

## 2024-05-11 PROCEDURE — 99215 OFFICE O/P EST HI 40 MIN: CPT

## 2024-05-11 PROCEDURE — 87081 CULTURE SCREEN ONLY: CPT

## 2024-05-11 PROCEDURE — 81002 URINALYSIS NONAUTO W/O SCOPE: CPT

## 2024-05-11 RX ORDER — LIDOCAINE HYDROCHLORIDE 10 MG/ML
0.5 INJECTION INFILTRATION; PERINEURAL ONCE AS NEEDED
Status: DISCONTINUED | OUTPATIENT
Start: 2024-05-11 | End: 2024-05-12 | Stop reason: HOSPADM

## 2024-05-11 RX ORDER — LABETALOL HYDROCHLORIDE 5 MG/ML
20 INJECTION, SOLUTION INTRAVENOUS ONCE AS NEEDED
Status: DISCONTINUED | OUTPATIENT
Start: 2024-05-11 | End: 2024-05-12 | Stop reason: HOSPADM

## 2024-05-11 RX ORDER — NIFEDIPINE 10 MG/1
10 CAPSULE ORAL ONCE AS NEEDED
Status: DISCONTINUED | OUTPATIENT
Start: 2024-05-11 | End: 2024-05-12 | Stop reason: HOSPADM

## 2024-05-11 RX ORDER — HYDRALAZINE HYDROCHLORIDE 20 MG/ML
5 INJECTION INTRAMUSCULAR; INTRAVENOUS ONCE AS NEEDED
Status: DISCONTINUED | OUTPATIENT
Start: 2024-05-11 | End: 2024-05-12 | Stop reason: HOSPADM

## 2024-05-11 RX ORDER — CYCLOBENZAPRINE HCL 10 MG
10 TABLET ORAL ONCE
Status: DISCONTINUED | OUTPATIENT
Start: 2024-05-11 | End: 2024-05-12 | Stop reason: HOSPADM

## 2024-05-11 RX ORDER — METOCLOPRAMIDE 10 MG/1
10 TABLET ORAL EVERY 6 HOURS PRN
Status: DISCONTINUED | OUTPATIENT
Start: 2024-05-11 | End: 2024-05-12 | Stop reason: HOSPADM

## 2024-05-11 SDOH — SOCIAL STABILITY: SOCIAL INSECURITY: WITHIN THE LAST YEAR, HAVE YOU BEEN HUMILIATED OR EMOTIONALLY ABUSED IN OTHER WAYS BY YOUR PARTNER OR EX-PARTNER?: NO

## 2024-05-11 SDOH — HEALTH STABILITY: MENTAL HEALTH: SUICIDAL BEHAVIOR (LIFETIME): NO

## 2024-05-11 SDOH — ECONOMIC STABILITY: TRANSPORTATION INSECURITY
IN THE PAST 12 MONTHS, HAS LACK OF TRANSPORTATION KEPT YOU FROM MEETINGS, WORK, OR FROM GETTING THINGS NEEDED FOR DAILY LIVING?: NO

## 2024-05-11 SDOH — ECONOMIC STABILITY: INCOME INSECURITY: HOW HARD IS IT FOR YOU TO PAY FOR THE VERY BASICS LIKE FOOD, HOUSING, MEDICAL CARE, AND HEATING?: NOT HARD AT ALL

## 2024-05-11 SDOH — ECONOMIC STABILITY: FOOD INSECURITY: WITHIN THE PAST 12 MONTHS, YOU WORRIED THAT YOUR FOOD WOULD RUN OUT BEFORE YOU GOT MONEY TO BUY MORE.: NEVER TRUE

## 2024-05-11 SDOH — SOCIAL STABILITY: SOCIAL INSECURITY: DOES ANYONE TRY TO KEEP YOU FROM HAVING/CONTACTING OTHER FRIENDS OR DOING THINGS OUTSIDE YOUR HOME?: NO

## 2024-05-11 SDOH — SOCIAL STABILITY: SOCIAL INSECURITY: HAS ANYONE EVER THREATENED TO HURT YOUR FAMILY OR YOUR PETS?: NO

## 2024-05-11 SDOH — ECONOMIC STABILITY: FOOD INSECURITY: WITHIN THE PAST 12 MONTHS, THE FOOD YOU BOUGHT JUST DIDN'T LAST AND YOU DIDN'T HAVE MONEY TO GET MORE.: NEVER TRUE

## 2024-05-11 SDOH — SOCIAL STABILITY: SOCIAL INSECURITY: VERBAL ABUSE: DENIES

## 2024-05-11 SDOH — HEALTH STABILITY: MENTAL HEALTH: NON-SPECIFIC ACTIVE SUICIDAL THOUGHTS (PAST 1 MONTH): NO

## 2024-05-11 SDOH — SOCIAL STABILITY: SOCIAL INSECURITY: PHYSICAL ABUSE: DENIES

## 2024-05-11 SDOH — ECONOMIC STABILITY: TRANSPORTATION INSECURITY
IN THE PAST 12 MONTHS, HAS THE LACK OF TRANSPORTATION KEPT YOU FROM MEDICAL APPOINTMENTS OR FROM GETTING MEDICATIONS?: NO

## 2024-05-11 SDOH — SOCIAL STABILITY: SOCIAL INSECURITY: ARE YOU OR HAVE YOU BEEN THREATENED OR ABUSED PHYSICALLY, EMOTIONALLY, OR SEXUALLY BY ANYONE?: NO

## 2024-05-11 SDOH — SOCIAL STABILITY: SOCIAL INSECURITY: ABUSE SCREEN: ADULT

## 2024-05-11 SDOH — SOCIAL STABILITY: SOCIAL INSECURITY
WITHIN THE LAST YEAR, HAVE YOU BEEN KICKED, HIT, SLAPPED, OR OTHERWISE PHYSICALLY HURT BY YOUR PARTNER OR EX-PARTNER?: NO

## 2024-05-11 SDOH — HEALTH STABILITY: MENTAL HEALTH: WISH TO BE DEAD (PAST 1 MONTH): NO

## 2024-05-11 SDOH — SOCIAL STABILITY: SOCIAL INSECURITY: HAVE YOU HAD THOUGHTS OF HARMING ANYONE ELSE?: NO

## 2024-05-11 SDOH — HEALTH STABILITY: MENTAL HEALTH: HAVE YOU USED ANY SUBSTANCES (CANABIS, COCAINE, HEROIN, HALLUCINOGENS, INHALANTS, ETC.) IN THE PAST 12 MONTHS?: NO

## 2024-05-11 SDOH — SOCIAL STABILITY: SOCIAL INSECURITY: WITHIN THE LAST YEAR, HAVE YOU BEEN AFRAID OF YOUR PARTNER OR EX-PARTNER?: NO

## 2024-05-11 SDOH — SOCIAL STABILITY: SOCIAL INSECURITY: HAVE YOU HAD ANY THOUGHTS OF HARMING ANYONE ELSE?: NO

## 2024-05-11 SDOH — HEALTH STABILITY: MENTAL HEALTH: WERE YOU ABLE TO COMPLETE ALL THE BEHAVIORAL HEALTH SCREENINGS?: YES

## 2024-05-11 SDOH — SOCIAL STABILITY: SOCIAL INSECURITY
WITHIN THE LAST YEAR, HAVE TO BEEN RAPED OR FORCED TO HAVE ANY KIND OF SEXUAL ACTIVITY BY YOUR PARTNER OR EX-PARTNER?: NO

## 2024-05-11 SDOH — SOCIAL STABILITY: SOCIAL INSECURITY: DO YOU FEEL ANYONE HAS EXPLOITED OR TAKEN ADVANTAGE OF YOU FINANCIALLY OR OF YOUR PERSONAL PROPERTY?: NO

## 2024-05-11 SDOH — HEALTH STABILITY: MENTAL HEALTH: HAVE YOU USED ANY PRESCRIPTION DRUGS OTHER THAN PRESCRIBED IN THE PAST 12 MONTHS?: NO

## 2024-05-11 SDOH — SOCIAL STABILITY: SOCIAL INSECURITY: ARE THERE ANY APPARENT SIGNS OF INJURIES/BEHAVIORS THAT COULD BE RELATED TO ABUSE/NEGLECT?: NO

## 2024-05-11 SDOH — ECONOMIC STABILITY: HOUSING INSECURITY: DO YOU FEEL UNSAFE GOING BACK TO THE PLACE WHERE YOU ARE LIVING?: NO

## 2024-05-11 ASSESSMENT — LIFESTYLE VARIABLES
HAVE YOU EVER FELT YOU SHOULD CUT DOWN ON YOUR DRINKING: NO
AUDIT-C TOTAL SCORE: 0
TOTAL SCORE: 0
HOW MANY STANDARD DRINKS CONTAINING ALCOHOL DO YOU HAVE ON A TYPICAL DAY: PATIENT DOES NOT DRINK
SKIP TO QUESTIONS 9-10: 1
AUDIT-C TOTAL SCORE: 0
EVER FELT BAD OR GUILTY ABOUT YOUR DRINKING: NO
HOW OFTEN DO YOU HAVE 6 OR MORE DRINKS ON ONE OCCASION: NEVER
HAVE PEOPLE ANNOYED YOU BY CRITICIZING YOUR DRINKING: NO
EVER HAD A DRINK FIRST THING IN THE MORNING TO STEADY YOUR NERVES TO GET RID OF A HANGOVER: NO
HOW OFTEN DO YOU HAVE A DRINK CONTAINING ALCOHOL: NEVER

## 2024-05-11 ASSESSMENT — COLUMBIA-SUICIDE SEVERITY RATING SCALE - C-SSRS
2. HAVE YOU ACTUALLY HAD ANY THOUGHTS OF KILLING YOURSELF?: NO
6. HAVE YOU EVER DONE ANYTHING, STARTED TO DO ANYTHING, OR PREPARED TO DO ANYTHING TO END YOUR LIFE?: NO
1. IN THE PAST MONTH, HAVE YOU WISHED YOU WERE DEAD OR WISHED YOU COULD GO TO SLEEP AND NOT WAKE UP?: NO

## 2024-05-11 ASSESSMENT — PATIENT HEALTH QUESTIONNAIRE - PHQ9
2. FEELING DOWN, DEPRESSED OR HOPELESS: NOT AT ALL
1. LITTLE INTEREST OR PLEASURE IN DOING THINGS: NOT AT ALL
SUM OF ALL RESPONSES TO PHQ9 QUESTIONS 1 & 2: 0

## 2024-05-12 NOTE — ED TRIAGE NOTES
Patient presents to the ED for L&D. Patient is . She is currently 33 weeks pregnant. She states that her water broke approximately 10 minutes ago and she is having contractions every 3 minutes.   Abdomen soft, non-tender, no guarding.

## 2024-05-12 NOTE — H&P
Obstetrical Admission History and Physical     Job Metz is a 23 y.o. .     Chief Complaint: Pregnancy Problem and Leakage/Loss of Fluid    Assessment/Plan    r/o ROM  - Pooling/nitrazine/ferning negative x3; ABEBA 14cm  - SVE: closed  - CTX: q 30  min  - GBS collected and sent    Headache  - reglan and flexeril offered, however, patient declined  - normotensive, no other PEC sx  - return precautions discussed    Maternal Well-Being  - No acute distress  - Vitals stable and WNL  - hx of gHTN in previous pregnancy. Normotensive today. BP cuff given    Fetal Well-Being  - NST reactive; BPP /  - Continue current prenatal care    Dispo  - Safe and stable for d/c to home  - Follow-up with OB provider as scheduled    Reviewed labor precautions with patient. Discussed need to return to labor and delivery if patient has strong, regular contractions, leakage of fluid, vaginal bleeding, or decreased fetal movement. Patient expresses understanding. Safe and stable for discharge at this time.    Seen and discussed with Dr. Hernan Cruz MD PGY-1        Active Problems:  There are no active Hospital Problems.      Pregnancy Problems (from 22 to present)       Problem Noted Resolved    Anemia affecting pregnancy in third trimester ([facility]) 2024 by Deanna Santoro MD No    Priority:  Medium       uterine contractions ([facility]) 2024 by Deanna Santoro MD No    Priority:  Medium      Short interval between pregnancies affecting pregnancy in third trimester, antepartum ([facility]) 2024 by Deanna Santoro MD No    Priority:  Medium      Pica, in adults 2024 by LEIGH Dawn No    Priority:  Medium      Overview Signed 2024  2:57 PM by LEIGH Dawn     2024  Patient ijndicates that she is eating cardboard very little bits every day. Will retest anemia panel. SGP         Insufficient prenatal care in second trimester ([facility]) 3/12/2024 by  LEIGH Dawn No    Priority:  Medium      Overview Addendum 3/12/2024  8:51 PM by LEIGH Dawn     3/12/2024  Patient seen for initial prenatal visit at 5w5d, did present for anatomy scan. Next visit was at 24 weeks. Prenatal panel reordered at that visit with one hour glucose. Reviewed importance of keeping appointments. SGP         Anemia of mother in pregnancy, delivered with postpartum condition ([facility]) 3/12/2024 by LEIGH Dawn No    Priority:  Medium      Overview Addendum 3/12/2024  8:53 PM by LEIGH Dawn     3/12/2024  Second trimester h/h 10.1/29.9. iron BID sent to pharmacy. Plan to repeat labs in 2-3 weeks. SGP           Nausea and vomiting in pregnancy prior to 22 weeks gestation ([facility]) 2023 by Deanna Santoro MD No    Priority:  Medium      Overview Signed 2023  7:37 PM by Deanna Santoro MD     B6, Unisom, Zofran               Subjective   Job is here complaining of loss of mucus plug and cramping. Reports having pink tinged mucus discharge this evening. Had intercourse this morning. Good fetal movement. Denies vaginal bleeding., Denies leaking of fluid.  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    Postinflammatory hyperpigmentation 2016        Past Surgical History   Past Surgical History:   Procedure Laterality Date    BARTHOLIN GLAND CYST EXCISION Left 2023       Social History  Social History     Tobacco Use    Smoking status: Never    Smokeless tobacco: Never    Substance Use Topics    Alcohol use: Never     Substance and Sexual Activity   Drug Use Never       Allergies  Latex and Other     Medications  Medications Prior to Admission   Medication Sig Dispense Refill Last Dose    acetaminophen (Tylenol) 325 mg tablet Take 3 tablets (975 mg) by mouth every 6 hours if needed for mild pain (1 - 3). 60 tablet 0     cyclobenzaprine (Flexeril) 10 mg tablet Take 1 tablet (10 mg) by mouth once daily. 5 tablet 0     doxylamine (Unisom, doxylamine,) 25 mg tablet Take 1 tablet (25 mg) by mouth as needed at bedtime for sleep or nausea. 30 tablet 0     ferrous fumarate-vitamin B12-vitamin C-folic acid (Foltrin) 110-0.5 mg capsule Take 1 capsule by mouth 2 times a day before meals. 60 capsule 11     ferrous sulfate 8.8 mg/mL elemental iron elixir Please take 10 ml orally twice daily 600 mL 2     metoclopramide (Reglan) 10 mg tablet Take 1 tablet (10 mg) by mouth every 8 hours if needed (nausea and vomiting) for up to 7 days. 21 tablet 0     prenatal vitamin, iron-folic, 27 mg iron-800 mcg folic acid tablet Take 1 tablet by mouth once daily. 90 tablet 3        Objective    Last Vitals  Temp Pulse Resp BP MAP O2 Sat   36.3 °C (97.3 °F) 84 18 121/75   100 %     Physical Examination  Cervical Exam  Dilation: Closed  Effacement (%): 0  Fetal Station: -3  OB Examiner: Anthony ELLIS         FHT: 145/mod/+accels/-decels   SSE: No pooling, small amount of clear mucus in vault   ABEBA 14.1cm  BPP 8/8    Lab Review  Labs in chart were reviewed.

## 2024-05-12 NOTE — PROGRESS NOTES
I saw & evaluate the patient. Presenting for r/o PPROM, negative x3- ABEBA 14. BPP 8/8. No continued leakage of fluid.  Reports intermittent contractions, SVE cl/thick/high.  HA, but normotensive, declines medication management, and desires discharge home.  She does have a history of gHTN in prior pregnancy and we will discharge with a BP cuff and instructions.  She has follow up in 2 weeks with prenatal care provider.  Return precautions discussed at length.     Jael Becerra MD

## 2024-05-14 LAB — GP B STREP GENITAL QL CULT: NORMAL

## 2024-05-22 ENCOUNTER — ROUTINE PRENATAL (OUTPATIENT)
Dept: OBSTETRICS AND GYNECOLOGY | Facility: CLINIC | Age: 24
End: 2024-05-22
Payer: COMMERCIAL

## 2024-05-22 ENCOUNTER — HOSPITAL ENCOUNTER (OUTPATIENT)
Facility: HOSPITAL | Age: 24
Discharge: HOME | End: 2024-05-23
Attending: OBSTETRICS & GYNECOLOGY | Admitting: OBSTETRICS & GYNECOLOGY
Payer: COMMERCIAL

## 2024-05-22 VITALS — WEIGHT: 172.6 LBS | SYSTOLIC BLOOD PRESSURE: 111 MMHG | BODY MASS INDEX: 26.24 KG/M2 | DIASTOLIC BLOOD PRESSURE: 72 MMHG

## 2024-05-22 DIAGNOSIS — R10.2 PELVIC PAIN AFFECTING PREGNANCY IN SECOND TRIMESTER, ANTEPARTUM (HHS-HCC): ICD-10-CM

## 2024-05-22 DIAGNOSIS — D57.3 SICKLE CELL TRAIT (CMS-HCC): ICD-10-CM

## 2024-05-22 DIAGNOSIS — O09.893 SHORT INTERVAL BETWEEN PREGNANCIES AFFECTING PREGNANCY IN THIRD TRIMESTER, ANTEPARTUM (HHS-HCC): Primary | ICD-10-CM

## 2024-05-22 DIAGNOSIS — Z87.59 HISTORY OF GESTATIONAL HYPERTENSION: ICD-10-CM

## 2024-05-22 DIAGNOSIS — O99.013 ANEMIA AFFECTING PREGNANCY IN THIRD TRIMESTER (HHS-HCC): ICD-10-CM

## 2024-05-22 DIAGNOSIS — O26.892 PELVIC PAIN AFFECTING PREGNANCY IN SECOND TRIMESTER, ANTEPARTUM (HHS-HCC): ICD-10-CM

## 2024-05-22 DIAGNOSIS — R10.2 PELVIC PAIN: ICD-10-CM

## 2024-05-22 DIAGNOSIS — Z87.59 HISTORY OF VACUUM EXTRACTION ASSISTED DELIVERY: ICD-10-CM

## 2024-05-22 PROCEDURE — 0501F PRENATAL FLOW SHEET: CPT | Performed by: OBSTETRICS & GYNECOLOGY

## 2024-05-22 NOTE — PATIENT INSTRUCTIONS
DR. SANTORO'S DELIVERY GUIDE    HOW SHOULD I PREPARE?  Think about what you want your delivery to be like and let your team know  Don't hesitate to speak up if you have concerns or questions  Stay mentally flexible - even with modern medicine, delivery can be unpredictable!  Make sure you have reliable and timely transportation to the hospital  Keep your gas tank at least ¼ full  Keep a towel in the car (to catch amniotic fluid if your water breaks)  Plan for childcare, weather-related traffic delays, etc.  Hospital bag suggestions: birth plan, phone chargers, personal toiletries, hair accessories, chewing gum, water bottle, personal pillow or blanket, things to help you relax during labor, change of clothes for postpartum, nursing bra, eye mask and ear plugs, flip flops for shower, personal towel for shower, baby clothes for going home (bring 1 size up and down from expected size)    WHEN SHOULD I CALL?  When your water breaks (can feel like a gush, or a non-stop trickle of fluid)  When your contractions have been happening regularly for at least 2 hours non-stop AND  First Delivery: Contractions are occurring every 5 minutes or less  Repeat Delivery/Planned : Contractions are occurring every 8 minutes or less  If the baby is moving less than usual   If you experience bleeding like a period  For any other symptoms that just doesn't feel “right” to you    WHO DO I CALL?  Call [phone] for the [facility] office and [phone] for the [place] office. During the day, ask the  to direct you to the office nurse. After hours, you will be directed to the doctor on Labor & Delivery by the answering service. Please make sure to tell the answering service which L&D location you plan to go to so they can contact the correct on call provider.    WHERE DO I DELIVER?  Low risk pregnancies have the option to deliver at any  hospital. High risk pregnancies should deliver at [facility]. Dr. Santoro  only delivers at [facility], typically on Thursday nights. To schedule a hospital tour, call [phone].    HOW DO I GET THERE?  [facility] at [facility]: [address], [place]. This will take you to the entrance of [facility], which connects to [facility]. The parking garage is also closest to this address, and there is a  as well. Walk through [facility] towards the atrium, then [facility] will be on your right just past the [employer] shop. Labor and Delivery is located on the 2nd floor via elevator.   [facility]  [place] [facility] [address]. Entrance is located between the Emergency Department and the Main Hospital building. Look for the [employer] logo above the entrance. You can also enter from the entrance to the [facility] where there is a  - just turn right past the information desk and walk down the gallery may until you reach [facility]. Labor and Delivery is located on the 3rd floor via elevator.    WHO CAN BE WITH ME?  The visitor policy can be found online at [email or web address].   Certified doulas do not count as visitors.     HOW CAN I DECREASE MY RISK FOR  DELIVERY?  Keep your body as strong and healthy as possible  Look through the [employer] website to understand how movement can help your baby get in the right place in the pelvis   Use professional birth support, such as a    Change positions frequently during labor  Stay well hydrated throughout your labor, including “clears” for caloric nourishment (honey water, apple juice, etc.)   Allow enough time for labor - it can take over 24 hours!  Rest when you can so you are mentally ready to push once you get to 10cm dilated  Try pushing in a different position if you're not making progress. Consider pushing on hands and knees, closed  knee pushing, use of a birthing bar, etc.     Delivery is a team effort. Please speak up if you don't feel included in the decision-making.       DR. HENLYE'S POSTPARTUM GUIDE    Going through all the physical and emotional changes of pregnancy is no small feat, and it's important to realize these changes continue even after delivery. Only about 50% of women go to their postpartum visits, which means a lot of women are missing out on an opportunity to check on their own health, receive support, and ask important questions. Every woman should have a check-up 4-6 weeks after their delivery, and some women will need to be seen even sooner than that.    We highly encourage you to take your postpartum care just as seriously as your prenatal care. Many serious long-term health issues related to pregnancy actually happen postpartum. Our goal is to help you feel supported, capable, and safe as you navigate your unique post-pregnancy journey.    SAFETY  You should immediately contact the doctor's office if you experience any of the following:  Chest pain or trouble breathing  Blood pressures >150/100, that is just as high or higher when repeated 20 minutes later  Severe abdominal pain not responding to your discharge pain medications  Heavy bleeding fully soaking a pad in under 30 minutes or soiling your clothes   Chills, shakes, or fever >100.4°F  Suicidal thoughts  You should also call anytime you just don't feel right - it is always better to be safe than sorry!    COMMON POSTPARTUM CONCERNS  A variety of other postpartum issues are just as harmful to a woman's wellbeing and health, even if they are not deadly. Please call for an appointment if you have concerns about the following, or any other bothersome issue:  Breastfeeding difficulties  Mood changes such as depression or anxiety  Pain during sex or with activity  Abnormal uterine bleeding past 6-8 weeks postpartum  You are not alone, and we are here to help  you!    WHAT TO EXPECT AT YOUR POSTPARTUM VISIT  During your postpartum visit, we will ask you questions about your pregnancy complications, postpartum mood and support, infant feeding journey, contraceptive plans, and more. If your concerns require a more in-depth evaluation, we may ask you to come back for a follow-up visit. Certain follow-up visits may be billed outside of your insurance's OB global package.     POSTPARTUM SUPPORT RESOURCES    [facility] POSTPARTUM PROGRAMMING  https://[email or web address].[email or web address]/services/obgy-[facility]/patient-resources/classes-and-support   [phone]  Free parenting support offered via “Mommy and Baby Too!” peer groups and WIC-lead “Healthy Mom and Baby” programs.    [facility] LACTATION SUPPORT  http://www.[email or web address].org/ann/health-and-wellness/pregnancy-resources/lactation-services  [phone]  In person and virtual appointments offered at multiple locations for breastfeeding support.    BREASTFEEDING MEDICINE [facility]  [email or web address]  [phone]. Provides full spectrum breastfeeding assistance. Located in the [facility] Pediatrics building, but open to parents seeing other pediatricians.    [facility] PELVIC FLOOR PHYSICAL THERAPY  https://www.[facility].org/services/obgy-[facility]/female-pelvic-health/conditions-and-treatments/dsryor-zpdpn-pxapuonlxaacgk  [phone]  Licensed female therapists help with a variety of postpartum pelvic floor concerns, including pain, weakness, incontinence, and more. Referral may be required.     [facility] WOMEN'S MENTAL HEALTH CLINIC  https://www.[email or web address].org/services/psychiatry/conditions-treatments/womens-mental-health   [phone], ask for “Women's Mental Health” providers for help with postpartum anxiety, depression, OCD, PTSD, and more.    HELP ME GROW  http://www.helpmegrow.[place].gov/  Help Me Grow is an evidence-based program that promotes healthy  growth and development for babies and young children by providing professional support in the home for pregnant mothers or new parents. You can self-refer through the website or ask your doctor to make a referral.    LOOKING FOR PROFESSIONAL POSTPARTUM SUPPORT?  [employer] ( Certifying Organization)  https://www.janeth.[email or web address]/  Type in your zip code to find a certified postpartum  near you    [facility]  http://www.[facility] Hospital[facility].[email or web address]/    [facility]  http://www.[employer]/    [employer]   [email or web address]/

## 2024-05-22 NOTE — PROGRESS NOTES
Anemia: on iron elixir. Repeat labs on 5/8 showed improvement in Hgb to 9.8.  Hx GHTN: BP continues to be normal  SST: repeat urine culture next visit (last March 2024)  GBS neg on 5/11/24    Pt was seen in ED on 5/11 for possible LOF, but was ruled out for PPROM.   Feels like baby dropped yesterday and she is experiencing a lot of rectal pressure. SVE showed cx is still closed, no evidence of ROM on SSE, baby's head feels vertex but hyperextended. Recommend some positional changes for comfort.     Deanna Santoro MD

## 2024-05-23 VITALS
BODY MASS INDEX: 26.63 KG/M2 | DIASTOLIC BLOOD PRESSURE: 67 MMHG | HEIGHT: 68 IN | WEIGHT: 175.71 LBS | HEART RATE: 102 BPM | TEMPERATURE: 98.2 F | RESPIRATION RATE: 16 BRPM | OXYGEN SATURATION: 97 % | SYSTOLIC BLOOD PRESSURE: 130 MMHG

## 2024-05-23 PROCEDURE — 99214 OFFICE O/P EST MOD 30 MIN: CPT

## 2024-05-23 PROCEDURE — 59025 FETAL NON-STRESS TEST: CPT

## 2024-05-23 PROCEDURE — 2500000001 HC RX 250 WO HCPCS SELF ADMINISTERED DRUGS (ALT 637 FOR MEDICARE OP)

## 2024-05-23 PROCEDURE — 99215 OFFICE O/P EST HI 40 MIN: CPT

## 2024-05-23 PROCEDURE — 59025 FETAL NON-STRESS TEST: CPT | Mod: GC

## 2024-05-23 RX ORDER — CYCLOBENZAPRINE HCL 10 MG
10 TABLET ORAL ONCE
Status: COMPLETED | OUTPATIENT
Start: 2024-05-23 | End: 2024-05-23

## 2024-05-23 RX ORDER — CYCLOBENZAPRINE HCL 10 MG
10 TABLET ORAL 3 TIMES DAILY PRN
Qty: 8 TABLET | Refills: 0 | Status: SHIPPED | OUTPATIENT
Start: 2024-05-23

## 2024-05-23 RX ADMIN — CYCLOBENZAPRINE 10 MG: 10 TABLET, FILM COATED ORAL at 01:24

## 2024-05-23 SDOH — HEALTH STABILITY: MENTAL HEALTH: HAVE YOU USED ANY SUBSTANCES (CANABIS, COCAINE, HEROIN, HALLUCINOGENS, INHALANTS, ETC.) IN THE PAST 12 MONTHS?: NO

## 2024-05-23 SDOH — ECONOMIC STABILITY: HOUSING INSECURITY: DO YOU FEEL UNSAFE GOING BACK TO THE PLACE WHERE YOU ARE LIVING?: NO

## 2024-05-23 SDOH — HEALTH STABILITY: MENTAL HEALTH: HAVE YOU USED ANY PRESCRIPTION DRUGS OTHER THAN PRESCRIBED IN THE PAST 12 MONTHS?: NO

## 2024-05-23 SDOH — SOCIAL STABILITY: SOCIAL INSECURITY: HAVE YOU HAD ANY THOUGHTS OF HARMING ANYONE ELSE?: NO

## 2024-05-23 SDOH — HEALTH STABILITY: MENTAL HEALTH: WERE YOU ABLE TO COMPLETE ALL THE BEHAVIORAL HEALTH SCREENINGS?: YES

## 2024-05-23 SDOH — SOCIAL STABILITY: SOCIAL INSECURITY: ARE THERE ANY APPARENT SIGNS OF INJURIES/BEHAVIORS THAT COULD BE RELATED TO ABUSE/NEGLECT?: NO

## 2024-05-23 SDOH — SOCIAL STABILITY: SOCIAL INSECURITY: HAS ANYONE EVER THREATENED TO HURT YOUR FAMILY OR YOUR PETS?: NO

## 2024-05-23 SDOH — SOCIAL STABILITY: SOCIAL INSECURITY: ABUSE SCREEN: ADULT

## 2024-05-23 SDOH — SOCIAL STABILITY: SOCIAL INSECURITY: HAVE YOU HAD THOUGHTS OF HARMING ANYONE ELSE?: NO

## 2024-05-23 SDOH — SOCIAL STABILITY: SOCIAL INSECURITY: DOES ANYONE TRY TO KEEP YOU FROM HAVING/CONTACTING OTHER FRIENDS OR DOING THINGS OUTSIDE YOUR HOME?: NO

## 2024-05-23 SDOH — SOCIAL STABILITY: SOCIAL INSECURITY: ARE YOU OR HAVE YOU BEEN THREATENED OR ABUSED PHYSICALLY, EMOTIONALLY, OR SEXUALLY BY ANYONE?: NO

## 2024-05-23 SDOH — SOCIAL STABILITY: SOCIAL INSECURITY: DO YOU FEEL ANYONE HAS EXPLOITED OR TAKEN ADVANTAGE OF YOU FINANCIALLY OR OF YOUR PERSONAL PROPERTY?: NO

## 2024-05-23 SDOH — HEALTH STABILITY: MENTAL HEALTH: WISH TO BE DEAD (PAST 1 MONTH): NO

## 2024-05-23 SDOH — SOCIAL STABILITY: SOCIAL INSECURITY: VERBAL ABUSE: DENIES

## 2024-05-23 SDOH — HEALTH STABILITY: MENTAL HEALTH: NON-SPECIFIC ACTIVE SUICIDAL THOUGHTS (PAST 1 MONTH): NO

## 2024-05-23 SDOH — HEALTH STABILITY: MENTAL HEALTH: SUICIDAL BEHAVIOR (LIFETIME): NO

## 2024-05-23 SDOH — SOCIAL STABILITY: SOCIAL INSECURITY: PHYSICAL ABUSE: DENIES

## 2024-05-23 ASSESSMENT — PAIN SCALES - GENERAL
PAINLEVEL_OUTOF10: 0 - NO PAIN
PAINLEVEL_OUTOF10: 0 - NO PAIN

## 2024-05-23 ASSESSMENT — LIFESTYLE VARIABLES
HOW MANY STANDARD DRINKS CONTAINING ALCOHOL DO YOU HAVE ON A TYPICAL DAY: PATIENT DOES NOT DRINK
HOW OFTEN DO YOU HAVE 6 OR MORE DRINKS ON ONE OCCASION: NEVER
AUDIT-C TOTAL SCORE: 0
SKIP TO QUESTIONS 9-10: 1
HOW OFTEN DO YOU HAVE A DRINK CONTAINING ALCOHOL: NEVER
AUDIT-C TOTAL SCORE: 0

## 2024-05-23 ASSESSMENT — PATIENT HEALTH QUESTIONNAIRE - PHQ9
SUM OF ALL RESPONSES TO PHQ9 QUESTIONS 1 & 2: 0
1. LITTLE INTEREST OR PLEASURE IN DOING THINGS: NOT AT ALL
2. FEELING DOWN, DEPRESSED OR HOPELESS: NOT AT ALL

## 2024-05-23 NOTE — DISCHARGE INSTRUCTIONS
You have been prescribed a muscle relaxer called Flexeril. You may take this every 8 hours as needed for cramping/contraction type pain. We also recommend a maternity belt to help elevate and relieve some of the deep pelvic pressure you are feeling. Lastly, we recommend Benadryl at night to help with sleeping; this is safe for both you and baby.    Call or return to triage if you have:  persistent nausea, vomiting  Difficulty breathing, headache, or visual disturbances  Hives  Persistent dizziness or light-headedness  Any other questions or concerns you may have after discharge    In an emergency, call 911 or go to an Emergency Department at a nearby hospital

## 2024-05-23 NOTE — ED TRIAGE NOTES
Pt to ED c/o contractions and states her water broke, states she has been leaking fluids over the past two weeks. Pt is 35 wks, due  , pt is not high risk.

## 2024-05-23 NOTE — H&P
Obstetrical Admission History and Physical    Job Metz is a 23 y.o. . 34w6d dated by LMP c/w 16w US on 2024.     Patient, with history of gHTN in her previous pregnancy, presenting to triage with concerns for labor in the setting of contractions every 3 minutes lasting for about 50 seconds each. Patient reports associated deep pelvic pressure and discomfort. She has taken tylenol, but this does not help her pain and also makes her nauseated. Had routine prenatal appointment this morning; she states she was not dilated on her cervical exam in the office today. Denies vaginal bleeding, decreased fetal movement, large gush of fluid, or any other complaints at this time.      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    Postinflammatory hyperpigmentation 2016        Past Surgical History   Past Surgical History:   Procedure Laterality Date    BARTHOLIN GLAND CYST EXCISION Left 2023       Social History  Social History     Tobacco Use    Smoking status: Never    Smokeless tobacco: Never   Substance Use Topics    Alcohol use: Never     Substance and Sexual Activity   Drug Use Never       Allergies  Latex and Other     Medications  Medications Prior to Admission   Medication Sig Dispense Refill Last Dose    acetaminophen (Tylenol) 325 mg tablet Take 3 tablets (975 mg) by mouth every 6 hours if needed for mild pain (1 - 3). 60 tablet 0 Unknown    doxylamine (Unisom, doxylamine,) 25 mg tablet Take 1 tablet (25 mg) by mouth as needed at bedtime for sleep or nausea. 30 tablet 0     ferrous  fumarate-vitamin B12-vitamin C-folic acid (Foltrin) 110-0.5 mg capsule Take 1 capsule by mouth 2 times a day before meals. 60 capsule 11 Unknown    ferrous sulfate 8.8 mg/mL elemental iron elixir Please take 10 ml orally twice daily 600 mL 2 Unknown    metoclopramide (Reglan) 10 mg tablet Take 1 tablet (10 mg) by mouth every 8 hours if needed (nausea and vomiting) for up to 7 days. 21 tablet 0     prenatal vitamin, iron-folic, 27 mg iron-800 mcg folic acid tablet Take 1 tablet by mouth once daily. 90 tablet 3 Unknown    [DISCONTINUED] cyclobenzaprine (Flexeril) 10 mg tablet Take 1 tablet (10 mg) by mouth once daily. 5 tablet 0 Unknown       Objective    Last Vitals  Temp Pulse Resp BP MAP O2 Sat   36.8 °C (98.2 °F) 95 (Simultaneous filing. User may not have seen previous data.) 16 116/65 (Simultaneous filing. User may not have seen previous data.)   97 %     Physical Examination  GENERAL: Examination reveals a well developed, well nourished, gravid female in no acute distress. She is alert and cooperative.  LUNGS: clear to auscultation bilaterally  HEART: regular rate and rhythm, S1, S2 normal, no murmur, click, rub or gallop  ABDOMEN: soft, gravid, nontender, nondistended, no abnormal masses, no epigastric pain  FHR is 130 , with moderate variability , and a category 1  tracing.    CERVIX: 0   cm dilated, 50  % effaced, -3  station  EXTREMITIES: no redness or tenderness in the calves or thighs, no edema  PSYCHOLOGICAL: awake and alert; oriented to person, place, and time    Lab Review  Labs in chart were reviewed.  GBS negative.     Medical Decision Making:  Patient here with concerns for labor in the setting of contractions every 3 minutes lasting for about 50 seconds each. On cervical exam she is 0/50/-3. Had a cervical check done today in the office, was told her cervix was closed but soft. Patient reporting severe discomfort at home not helped by tylenol. Discussed options with patient, shared decision to get a  dose of Flexeril in triage and be sent home with a prescription. Patient is also interested in trying benadryl to help her sleep. Recommending maternity belt to help relieve deep pelvic pressure. Discussion had regarding that both contractions and cervical change must be present for the patient to be in labor, patient expresses understanding. Has next routine prenatal appointment on 5/29. Return precautions provided.     Dispo: Discharge to home    Patient seen and discussed with attending physician Dr. Spence.     Janett Valentin, DO  Family Medicine, PGY-1

## 2024-05-29 ENCOUNTER — ROUTINE PRENATAL (OUTPATIENT)
Dept: OBSTETRICS AND GYNECOLOGY | Facility: CLINIC | Age: 24
End: 2024-05-29
Payer: COMMERCIAL

## 2024-05-29 VITALS — BODY MASS INDEX: 26.97 KG/M2 | DIASTOLIC BLOOD PRESSURE: 78 MMHG | SYSTOLIC BLOOD PRESSURE: 121 MMHG | WEIGHT: 177.4 LBS

## 2024-05-29 DIAGNOSIS — O47.00 PRETERM UTERINE CONTRACTIONS (HHS-HCC): ICD-10-CM

## 2024-05-29 DIAGNOSIS — D57.3 SICKLE CELL TRAIT (CMS-HCC): ICD-10-CM

## 2024-05-29 DIAGNOSIS — O21.9 NAUSEA AND VOMITING IN PREGNANCY PRIOR TO 22 WEEKS GESTATION (HHS-HCC): ICD-10-CM

## 2024-05-29 DIAGNOSIS — O99.013 ANEMIA AFFECTING PREGNANCY IN THIRD TRIMESTER (HHS-HCC): ICD-10-CM

## 2024-05-29 DIAGNOSIS — O09.893 SHORT INTERVAL BETWEEN PREGNANCIES AFFECTING PREGNANCY IN THIRD TRIMESTER, ANTEPARTUM (HHS-HCC): Primary | ICD-10-CM

## 2024-05-29 PROCEDURE — 0501F PRENATAL FLOW SHEET: CPT | Performed by: OBSTETRICS & GYNECOLOGY

## 2024-05-29 PROCEDURE — 87086 URINE CULTURE/COLONY COUNT: CPT | Performed by: OBSTETRICS & GYNECOLOGY

## 2024-05-29 NOTE — PROGRESS NOTES
Anemia: cont on iron elixir. Last Hgb 9.8, improved from before.   Hx GHTN: BP continues to be normal  SST: repeat urine culture (last March 2024)  GBS neg on 5/11/24, repeat in 5 weeks    Continuing to have ctx. Would like cx checked today.    Deanna Santoro MD

## 2024-05-31 LAB — BACTERIA UR CULT: NORMAL

## 2024-06-03 ENCOUNTER — HOSPITAL ENCOUNTER (OUTPATIENT)
Facility: HOSPITAL | Age: 24
Discharge: HOME | End: 2024-06-03
Attending: STUDENT IN AN ORGANIZED HEALTH CARE EDUCATION/TRAINING PROGRAM | Admitting: STUDENT IN AN ORGANIZED HEALTH CARE EDUCATION/TRAINING PROGRAM
Payer: COMMERCIAL

## 2024-06-03 VITALS
OXYGEN SATURATION: 100 % | TEMPERATURE: 97.5 F | RESPIRATION RATE: 18 BRPM | HEART RATE: 77 BPM | SYSTOLIC BLOOD PRESSURE: 124 MMHG | HEIGHT: 68 IN | WEIGHT: 177.4 LBS | DIASTOLIC BLOOD PRESSURE: 59 MMHG | BODY MASS INDEX: 26.89 KG/M2

## 2024-06-03 PROBLEM — O47.9 UTERINE CONTRACTIONS DURING PREGNANCY (HHS-HCC): Status: ACTIVE | Noted: 2024-06-03

## 2024-06-03 PROBLEM — Z03.71 NO LEAKAGE OF AMNIOTIC FLUID INTO VAGINA: Status: ACTIVE | Noted: 2024-06-03

## 2024-06-03 LAB
CLUE CELLS SPEC QL WET PREP: NORMAL
T VAGINALIS SPEC QL WET PREP: NORMAL
TRICHOMONAS REFLEX COMMENT: NORMAL
WBC VAG QL WET PREP: NORMAL
YEAST VAG QL WET PREP: NORMAL

## 2024-06-03 PROCEDURE — 99215 OFFICE O/P EST HI 40 MIN: CPT

## 2024-06-03 PROCEDURE — 59025 FETAL NON-STRESS TEST: CPT

## 2024-06-03 PROCEDURE — 87210 SMEAR WET MOUNT SALINE/INK: CPT

## 2024-06-03 PROCEDURE — 99213 OFFICE O/P EST LOW 20 MIN: CPT

## 2024-06-03 RX ORDER — LIDOCAINE HYDROCHLORIDE 10 MG/ML
0.5 INJECTION INFILTRATION; PERINEURAL ONCE AS NEEDED
Status: DISCONTINUED | OUTPATIENT
Start: 2024-06-03 | End: 2024-06-03 | Stop reason: HOSPADM

## 2024-06-03 SDOH — HEALTH STABILITY: MENTAL HEALTH: SUICIDAL BEHAVIOR (LIFETIME): NO

## 2024-06-03 SDOH — HEALTH STABILITY: MENTAL HEALTH: HAVE YOU USED ANY SUBSTANCES (CANABIS, COCAINE, HEROIN, HALLUCINOGENS, INHALANTS, ETC.) IN THE PAST 12 MONTHS?: NO

## 2024-06-03 SDOH — SOCIAL STABILITY: SOCIAL INSECURITY: HAS ANYONE EVER THREATENED TO HURT YOUR FAMILY OR YOUR PETS?: NO

## 2024-06-03 SDOH — SOCIAL STABILITY: SOCIAL INSECURITY: HAVE YOU HAD ANY THOUGHTS OF HARMING ANYONE ELSE?: NO

## 2024-06-03 SDOH — SOCIAL STABILITY: SOCIAL INSECURITY: ABUSE SCREEN: ADULT

## 2024-06-03 SDOH — ECONOMIC STABILITY: HOUSING INSECURITY: DO YOU FEEL UNSAFE GOING BACK TO THE PLACE WHERE YOU ARE LIVING?: NO

## 2024-06-03 SDOH — SOCIAL STABILITY: SOCIAL INSECURITY: DO YOU FEEL ANYONE HAS EXPLOITED OR TAKEN ADVANTAGE OF YOU FINANCIALLY OR OF YOUR PERSONAL PROPERTY?: NO

## 2024-06-03 SDOH — SOCIAL STABILITY: SOCIAL INSECURITY: ARE YOU OR HAVE YOU BEEN THREATENED OR ABUSED PHYSICALLY, EMOTIONALLY, OR SEXUALLY BY ANYONE?: NO

## 2024-06-03 SDOH — HEALTH STABILITY: MENTAL HEALTH: WISH TO BE DEAD (PAST 1 MONTH): NO

## 2024-06-03 SDOH — HEALTH STABILITY: MENTAL HEALTH: WERE YOU ABLE TO COMPLETE ALL THE BEHAVIORAL HEALTH SCREENINGS?: YES

## 2024-06-03 SDOH — SOCIAL STABILITY: SOCIAL INSECURITY: PHYSICAL ABUSE: DENIES

## 2024-06-03 SDOH — SOCIAL STABILITY: SOCIAL INSECURITY: HAVE YOU HAD THOUGHTS OF HARMING ANYONE ELSE?: NO

## 2024-06-03 SDOH — SOCIAL STABILITY: SOCIAL INSECURITY: DOES ANYONE TRY TO KEEP YOU FROM HAVING/CONTACTING OTHER FRIENDS OR DOING THINGS OUTSIDE YOUR HOME?: NO

## 2024-06-03 SDOH — SOCIAL STABILITY: SOCIAL INSECURITY: VERBAL ABUSE: DENIES

## 2024-06-03 SDOH — HEALTH STABILITY: MENTAL HEALTH: NON-SPECIFIC ACTIVE SUICIDAL THOUGHTS (PAST 1 MONTH): NO

## 2024-06-03 SDOH — SOCIAL STABILITY: SOCIAL INSECURITY: ARE THERE ANY APPARENT SIGNS OF INJURIES/BEHAVIORS THAT COULD BE RELATED TO ABUSE/NEGLECT?: NO

## 2024-06-03 ASSESSMENT — PATIENT HEALTH QUESTIONNAIRE - PHQ9
SUM OF ALL RESPONSES TO PHQ9 QUESTIONS 1 & 2: 0
2. FEELING DOWN, DEPRESSED OR HOPELESS: NOT AT ALL
1. LITTLE INTEREST OR PLEASURE IN DOING THINGS: NOT AT ALL

## 2024-06-03 ASSESSMENT — LIFESTYLE VARIABLES
HOW OFTEN DO YOU HAVE 6 OR MORE DRINKS ON ONE OCCASION: NEVER
AUDIT-C TOTAL SCORE: 0
HOW OFTEN DO YOU HAVE A DRINK CONTAINING ALCOHOL: NEVER
AUDIT-C TOTAL SCORE: 0
HOW MANY STANDARD DRINKS CONTAINING ALCOHOL DO YOU HAVE ON A TYPICAL DAY: PATIENT DOES NOT DRINK
SKIP TO QUESTIONS 9-10: 1

## 2024-06-03 ASSESSMENT — PAIN SCALES - GENERAL: PAINLEVEL_OUTOF10: 10 - WORST POSSIBLE PAIN

## 2024-06-03 NOTE — H&P
Obstetrical Triage History and Physical     Job Metz is a 23 y.o.  at 36.3 wga by LMP, c/w 16.4 week US presenting to OB triage for leakage of fluids and contractions.    Chief Complaint: Pregnancy Problem, Leakage/Loss of Fluid, and Contractions    Assessment/Plan      Leakage of fluid, contractions  - SSE: negative x3 for rupture  - SVE: 2/40/-3, declined 2 hr recheck  - [place]: irregular, >5 min apart  - Wet prep negative  - ABEBA 13  - Return precautions reviewed, patient verbalizes understanding     IUP at 36.3 wga  - NST reactive  - Good fetal movement  - Continue routine prenatal care  - Next appt     Maternal Well-being  - All questions and concerns addressed      Dispo  - patient appropriate for d/c home, agrees with plan  - f/u at next scheduled OB appt or to triage sooner as needed    Discussed plan and reviewed tracing with Dr. Irasema Barber, THERESA-CNP      Active Problems:    No leakage of amniotic fluid into vagina    Uterine contractions during pregnancy ([facility])      Pregnancy Problems (from 23 to present)       Problem Noted Resolved    Anemia affecting pregnancy in third trimester ([facility]) 2024 by Deanna Santoro MD No    Priority:  Medium       uterine contractions ([facility]) 2024 by Deanna Santoro MD No    Priority:  Medium      Short interval between pregnancies affecting pregnancy in third trimester, antepartum ([facility]) 2024 by Deanna Santoro MD No    Priority:  Medium      [place], in adults 2024 by LEIGH Dawn No    Priority:  Medium      Overview Signed 2024  2:57 PM by LEIGH Dawn     2024  Patient ijndicates that she is eating cardboard very little bits every day. Will retest anemia panel. SGP         Insufficient prenatal care in second trimester ([facility]) 3/12/2024 by LEIGH Dawn No    Priority:  Medium      Overview Addendum 3/12/2024  8:51 PM by Louise SANFORD  LEIGH Dominguez     3/12/2024  Patient seen for initial prenatal visit at 5w5d, did present for anatomy scan. Next visit was at 24 weeks. Prenatal panel reordered at that visit with one hour glucose. Reviewed importance of keeping appointments. SGP         Anemia of mother in pregnancy, delivered with postpartum condition ([facility]) 3/12/2024 by LIEGH Dawn No    Priority:  Medium      Overview Addendum 3/12/2024  8:53 PM by LEIGH Dawn     3/12/2024  Second trimester h/h 10.1/29.9. iron BID sent to pharmacy. Plan to repeat labs in 2-3 weeks. SGP           Nausea and vomiting in pregnancy prior to 22 weeks gestation ([facility]) 2023 by Deanna Santoro MD No    Priority:  Medium      Overview Signed 2023  7:37 PM by Deanna Santoro MD     B6, Unisom, Zofran               Subjective   Job is here complaining of leakage of fluid and contractions. Good fetal movement. Denies vaginal bleeding., Having contractions q 5 minutes.      Reports leakage of fluid while standing in her kitchen at 0000 today. There was a small puddle, about 6x6in on the floor. Clear, non-malodorous fluid. Patient voided afterwards and reports there was a lot of urine. Contractions started around the same time, were every 10 min, then about every 5-7 min.     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       Objective    Last Vitals  Temp Pulse Resp BP MAP O2 Sat   36.4 °C (97.5 °F) 77 18 124/59   100 %     Physical Examination  FHR is 135 , with Accelerations, and a category I  tracing.    [place] reading:  irregular, > 5 min apart  VAGINA: normal appearing vagina with normal color and discharge and no lesions noted, negative pooling, negative Nitrazine, negative ferning.  CERVIX: 2 cm dilated, 40 % effaced, -3 station; MEMBRANES are Intact  General: Examination reveals a well developed, well nourished, female, in no acute distress. She is alert and cooperative.  Lungs: symmetrical, non-labored breathing.  Cardiac: warm, well-perfused.  Abdomen: soft, non-tender, gravid.  Extremities: no redness or tenderness in the calves or thighs.  Neurological: alert, oriented, normal speech, no focal findings or movement disorder noted.     Non-Stress Test   Baseline Fetal Heart Rate for Non-Stress Test: 135 BPM  Variability in Waveform for Non-Stress Test: Moderate  Accelerations in Non-Stress Test: Yes, lasting at least 15 seconds, greater than/equal to 15 bpm  Decelerations in Non-Stress Test: None  Contractions in Non-Stress Test: Irregular  NST Contraction Frequency: 5  Acoustic Stimulator for Non-Stress Test: No  Interpretation of Non-Stress Test   Interpretation of Non-Stress Test: Reactive  Amniotic Fluid Index (If Indicated) (cm) for Non-Stress Test: 13 cm     Lab Review  Labs in chart were reviewed.

## 2024-06-09 ENCOUNTER — HOSPITAL ENCOUNTER (OUTPATIENT)
Facility: HOSPITAL | Age: 24
Discharge: HOME | End: 2024-06-09
Attending: OBSTETRICS & GYNECOLOGY | Admitting: OBSTETRICS & GYNECOLOGY
Payer: COMMERCIAL

## 2024-06-09 VITALS
DIASTOLIC BLOOD PRESSURE: 65 MMHG | RESPIRATION RATE: 17 BRPM | SYSTOLIC BLOOD PRESSURE: 108 MMHG | HEART RATE: 86 BPM | TEMPERATURE: 97.2 F | OXYGEN SATURATION: 94 %

## 2024-06-09 LAB
CLUE CELLS SPEC QL WET PREP: NORMAL
POC APPEARANCE, URINE: CLEAR
POC BILIRUBIN, URINE: NEGATIVE
POC BLOOD, URINE: NEGATIVE
POC COLOR, URINE: YELLOW
POC GLUCOSE, URINE: NEGATIVE MG/DL
POC KETONES, URINE: NEGATIVE MG/DL
POC LEUKOCYTES, URINE: ABNORMAL
POC NITRITE,URINE: NEGATIVE
POC PH, URINE: 7 PH
POC PROTEIN, URINE: NEGATIVE MG/DL
POC SPECIFIC GRAVITY, URINE: 1.02
POC UROBILINOGEN, URINE: 0.2 EU/DL
T VAGINALIS SPEC QL WET PREP: NORMAL
WBC VAG QL WET PREP: NORMAL
YEAST VAG QL WET PREP: NORMAL

## 2024-06-09 PROCEDURE — 87210 SMEAR WET MOUNT SALINE/INK: CPT | Performed by: OBSTETRICS & GYNECOLOGY

## 2024-06-09 PROCEDURE — 81002 URINALYSIS NONAUTO W/O SCOPE: CPT | Performed by: STUDENT IN AN ORGANIZED HEALTH CARE EDUCATION/TRAINING PROGRAM

## 2024-06-09 PROCEDURE — 87591 N.GONORRHOEAE DNA AMP PROB: CPT | Performed by: OBSTETRICS & GYNECOLOGY

## 2024-06-09 PROCEDURE — 99213 OFFICE O/P EST LOW 20 MIN: CPT | Performed by: OBSTETRICS & GYNECOLOGY

## 2024-06-09 SDOH — SOCIAL STABILITY: SOCIAL INSECURITY: DO YOU FEEL ANYONE HAS EXPLOITED OR TAKEN ADVANTAGE OF YOU FINANCIALLY OR OF YOUR PERSONAL PROPERTY?: NO

## 2024-06-09 SDOH — HEALTH STABILITY: MENTAL HEALTH: SUICIDAL BEHAVIOR (LIFETIME): NO

## 2024-06-09 SDOH — SOCIAL STABILITY: SOCIAL INSECURITY: ABUSE SCREEN: ADULT

## 2024-06-09 SDOH — SOCIAL STABILITY: SOCIAL INSECURITY: HAS ANYONE EVER THREATENED TO HURT YOUR FAMILY OR YOUR PETS?: NO

## 2024-06-09 SDOH — SOCIAL STABILITY: SOCIAL INSECURITY: VERBAL ABUSE: DENIES

## 2024-06-09 SDOH — SOCIAL STABILITY: SOCIAL INSECURITY: PHYSICAL ABUSE: DENIES

## 2024-06-09 SDOH — ECONOMIC STABILITY: HOUSING INSECURITY: DO YOU FEEL UNSAFE GOING BACK TO THE PLACE WHERE YOU ARE LIVING?: NO

## 2024-06-09 SDOH — SOCIAL STABILITY: SOCIAL INSECURITY: DOES ANYONE TRY TO KEEP YOU FROM HAVING/CONTACTING OTHER FRIENDS OR DOING THINGS OUTSIDE YOUR HOME?: NO

## 2024-06-09 SDOH — HEALTH STABILITY: MENTAL HEALTH: NON-SPECIFIC ACTIVE SUICIDAL THOUGHTS (PAST 1 MONTH): NO

## 2024-06-09 SDOH — SOCIAL STABILITY: SOCIAL INSECURITY: ARE THERE ANY APPARENT SIGNS OF INJURIES/BEHAVIORS THAT COULD BE RELATED TO ABUSE/NEGLECT?: NO

## 2024-06-09 SDOH — HEALTH STABILITY: MENTAL HEALTH: WISH TO BE DEAD (PAST 1 MONTH): NO

## 2024-06-09 SDOH — HEALTH STABILITY: MENTAL HEALTH: WERE YOU ABLE TO COMPLETE ALL THE BEHAVIORAL HEALTH SCREENINGS?: YES

## 2024-06-09 SDOH — SOCIAL STABILITY: SOCIAL INSECURITY: HAVE YOU HAD THOUGHTS OF HARMING ANYONE ELSE?: NO

## 2024-06-09 SDOH — SOCIAL STABILITY: SOCIAL INSECURITY: ARE YOU OR HAVE YOU BEEN THREATENED OR ABUSED PHYSICALLY, EMOTIONALLY, OR SEXUALLY BY ANYONE?: NO

## 2024-06-09 ASSESSMENT — PATIENT HEALTH QUESTIONNAIRE - PHQ9
1. LITTLE INTEREST OR PLEASURE IN DOING THINGS: NOT AT ALL
SUM OF ALL RESPONSES TO PHQ9 QUESTIONS 1 & 2: 0
2. FEELING DOWN, DEPRESSED OR HOPELESS: NOT AT ALL

## 2024-06-09 ASSESSMENT — PAIN SCALES - GENERAL: PAINLEVEL: 0 - NO PAIN

## 2024-06-09 ASSESSMENT — LIFESTYLE VARIABLES
HOW OFTEN DO YOU HAVE A DRINK CONTAINING ALCOHOL: NEVER
HOW MANY STANDARD DRINKS CONTAINING ALCOHOL DO YOU HAVE ON A TYPICAL DAY: PATIENT DOES NOT DRINK
AUDIT-C TOTAL SCORE: 0
HOW OFTEN DO YOU HAVE 6 OR MORE DRINKS ON ONE OCCASION: NEVER
SKIP TO QUESTIONS 9-10: 1
AUDIT-C TOTAL SCORE: 0

## 2024-06-09 NOTE — H&P
Obstetrical Admission History and Physical    Patient is a 23-year-old  3 para 1-0-1-1 with estimated gestational age of 37 weeks and 2 days.  Pregnancy relatively uncomplicated.  Was doing relatively well until 2 days ago when she had intercourse.  Since that time is noticed some low pelvic cramping along with vaginal discharge.  Denies any vaginal bleeding.  Denies fever or shaking chills.  Denies blood in urine or stool.  Good fetal movement denies contractions.    Physical exam:        /65 Comment: Simultaneous filing. User may not have seen previous data.  Pulse 86   Temp 36.2 °C (97.2 °F) (Temporal)   Resp 17   LMP 2023   SpO2 (!) 94%   Pelvic: External genitalia normal, vagina does reveal a pasty white discharge.  GC and Chlamydia cultures along with wet prep was done.  Sterile speculum exam negative for gross fluid, ferning and nitrazine.  Cervical exam: Cervix 2 cm 80% with -3 station.    Fetal heart tones in the 130s with good variability category 1.  No contractions noted on toco.  Appeared to be audible deceleration during speculum exam with return to baseline immediately after speculum was removed.     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    Postinflammatory hyperpigmentation 2016        Past Surgical History   Past Surgical History:   Procedure Laterality Date    BARTHOLIN GLAND CYST EXCISION Left 2023       Social History  Social History     Tobacco Use    Smoking status: Never    Smokeless tobacco: Never   Substance Use Topics    Alcohol use: Never     Substance and  Sexual Activity   Drug Use Never       Allergies  Latex and Other     Medications  Medications Prior to Admission   Medication Sig Dispense Refill Last Dose    acetaminophen (Tylenol) 325 mg tablet Take 3 tablets (975 mg) by mouth every 6 hours if needed for mild pain (1 - 3). 60 tablet 0     cyclobenzaprine (Flexeril) 10 mg tablet Take 1 tablet (10 mg) by mouth 3 times a day as needed for muscle spasms. 8 tablet 0     doxylamine (Unisom, doxylamine,) 25 mg tablet Take 1 tablet (25 mg) by mouth as needed at bedtime for sleep or nausea. 30 tablet 0     ferrous fumarate-vitamin B12-vitamin C-folic acid (Foltrin) 110-0.5 mg capsule Take 1 capsule by mouth 2 times a day before meals. 60 capsule 11     ferrous sulfate 8.8 mg/mL elemental iron elixir Please take 10 ml orally twice daily 600 mL 2     metoclopramide (Reglan) 10 mg tablet Take 1 tablet (10 mg) by mouth every 8 hours if needed (nausea and vomiting) for up to 7 days. 21 tablet 0     prenatal vitamin, iron-folic, 27 mg iron-800 mcg folic acid tablet Take 1 tablet by mouth once daily. 90 tablet 3        Objective    Last Vitals  Temp Pulse Resp BP MAP O2 Sat   36.2 °C (97.2 °F) 86 17 108/65 (Simultaneous filing. User may not have seen previous data.)   (!) 94 %       Assessment and plan: Pelvic pain suspect round ligament pain.  Vaginal discharge, wet prep along with GC and Chlamydia cultures done.  Will continue to monitor patient for 2 hours based on possible audible decel during pelvic exam.    Pt discharged to home after prolonged monitoring with no decelerations. She will follow up for routine ob care. Fetal kick counts at home, call if bleeding. Leaking fluid, decreased movement or 4 or more contractions per hour.

## 2024-06-10 LAB
C TRACH RRNA SPEC QL NAA+PROBE: NEGATIVE
N GONORRHOEA DNA SPEC QL PROBE+SIG AMP: NEGATIVE

## 2024-06-12 ENCOUNTER — APPOINTMENT (OUTPATIENT)
Dept: OBSTETRICS AND GYNECOLOGY | Facility: CLINIC | Age: 24
End: 2024-06-12
Payer: COMMERCIAL

## 2024-06-12 VITALS — DIASTOLIC BLOOD PRESSURE: 76 MMHG | SYSTOLIC BLOOD PRESSURE: 120 MMHG | BODY MASS INDEX: 27.06 KG/M2 | WEIGHT: 178 LBS

## 2024-06-12 DIAGNOSIS — O47.00 PRETERM UTERINE CONTRACTIONS (HHS-HCC): ICD-10-CM

## 2024-06-12 DIAGNOSIS — F50.89: ICD-10-CM

## 2024-06-12 DIAGNOSIS — Z34.90 ENCOUNTER FOR PREGNANCY RELATED EXAMINATION, ANTEPARTUM (HHS-HCC): ICD-10-CM

## 2024-06-12 DIAGNOSIS — Z87.59 HISTORY OF VACUUM EXTRACTION ASSISTED DELIVERY: ICD-10-CM

## 2024-06-12 DIAGNOSIS — O09.32 INSUFFICIENT PRENATAL CARE IN SECOND TRIMESTER (HHS-HCC): ICD-10-CM

## 2024-06-12 DIAGNOSIS — O09.893 SHORT INTERVAL BETWEEN PREGNANCIES AFFECTING PREGNANCY IN THIRD TRIMESTER, ANTEPARTUM (HHS-HCC): Primary | ICD-10-CM

## 2024-06-12 DIAGNOSIS — O47.9 UTERINE CONTRACTIONS DURING PREGNANCY (HHS-HCC): ICD-10-CM

## 2024-06-12 DIAGNOSIS — O99.013 ANEMIA AFFECTING PREGNANCY IN THIRD TRIMESTER (HHS-HCC): ICD-10-CM

## 2024-06-12 DIAGNOSIS — D57.3 SICKLE CELL TRAIT (CMS-HCC): ICD-10-CM

## 2024-06-12 DIAGNOSIS — O21.9 NAUSEA AND VOMITING IN PREGNANCY PRIOR TO 22 WEEKS GESTATION (HHS-HCC): ICD-10-CM

## 2024-06-12 DIAGNOSIS — Z87.59 HISTORY OF GESTATIONAL HYPERTENSION: ICD-10-CM

## 2024-06-12 PROCEDURE — 0501F PRENATAL FLOW SHEET: CPT | Performed by: OBSTETRICS & GYNECOLOGY

## 2024-06-12 PROCEDURE — 87081 CULTURE SCREEN ONLY: CPT

## 2024-06-12 NOTE — PROGRESS NOTES
Ob Follow-up    SUBJECTIVE    HPI: Job Metz is a 23 y.o.  at 37w5d here for RPNV.  She denies vaginal bleeding, leakage of fluid, decreased fetal movements, and contractions.  Would like to schedule induction of labor as soon as possible.    OBJECTIVE  Visit Vitals  /76   Wt 80.7 kg (178 lb)   LMP 2023   BMI 27.06 kg/m²   OB Status Pregnant   Smoking Status Never   BSA 1.97 m²      See OB flowsheet    ASSESSMENT & PLAN    Job Metz is a 23 y.o.  at 37w5d here for the following concerns we addressed today:    Problem List Items Addressed This Visit          Ob-Gyn Problems    Uterine contractions during pregnancy ([facility])    Short interval between pregnancies affecting pregnancy in third trimester, antepartum ([facility]) - Primary     uterine contractions ([facility])    Nausea and vomiting in pregnancy prior to 22 weeks gestation ([facility])    Overview     B6, Unisom, Zofran         Insufficient prenatal care in second trimester ([facility])    Overview     3/12/2024  Patient seen for initial prenatal visit at 5w5d, did present for anatomy scan. Next visit was at 24 weeks. Prenatal panel reordered at that visit with one hour glucose. Reviewed importance of keeping appointments. SGP         History of vacuum extraction assisted delivery    Overview     For prolonged 2nd stage of a 6#7oz baby         History of gestational hypertension    Overview     bASA at 12 weeks  Baseline PCR, HELLP            Other    Sickle cell trait (CMS-HCC)    Overview     FOB status = ?         Pica, in adults    Overview     2024  Patient ijndicates that she is eating cardboard very little bits every day. Will retest anemia panel. SGP         Anemia of mother in pregnancy, delivered with postpartum condition ([facility])    Overview     3/12/2024  Second trimester h/h 10.1/29.9. iron BID sent to pharmacy. Plan to repeat labs in 2-3 weeks. SGP           Anemia affecting pregnancy in third trimester  ([facility])     Other Visit Diagnoses       Encounter for pregnancy related examination, antepartum ([facility])                GBS repeated today, last done at 33 weeks.  CBC, reticulocytes today.  Reviewed labor and preeclampsia precautions  Schedule induction of labor at 39w, message sent to RN.    Chester Francois MD

## 2024-06-14 DIAGNOSIS — Z34.90 PREGNANCY (HHS-HCC): Primary | ICD-10-CM

## 2024-06-15 LAB — GP B STREP GENITAL QL CULT: NORMAL

## 2024-06-16 ENCOUNTER — HOSPITAL ENCOUNTER (OUTPATIENT)
Facility: HOSPITAL | Age: 24
Discharge: HOME | End: 2024-06-16
Attending: OBSTETRICS & GYNECOLOGY | Admitting: OBSTETRICS & GYNECOLOGY
Payer: COMMERCIAL

## 2024-06-16 VITALS
DIASTOLIC BLOOD PRESSURE: 59 MMHG | HEIGHT: 68 IN | HEART RATE: 78 BPM | OXYGEN SATURATION: 99 % | TEMPERATURE: 97.3 F | SYSTOLIC BLOOD PRESSURE: 119 MMHG | RESPIRATION RATE: 16 BRPM | WEIGHT: 188.05 LBS | BODY MASS INDEX: 28.5 KG/M2

## 2024-06-16 PROBLEM — O47.9 UTERINE CONTRACTIONS DURING PREGNANCY (HHS-HCC): Status: RESOLVED | Noted: 2024-06-03 | Resolved: 2024-06-16

## 2024-06-16 PROBLEM — J45.909 ASTHMA DURING PREGNANCY IN THIRD TRIMESTER (HHS-HCC): Status: ACTIVE | Noted: 2024-06-16

## 2024-06-16 PROBLEM — Z32.01 PREGNANCY TEST POSITIVE (HHS-HCC): Status: RESOLVED | Noted: 2023-11-02 | Resolved: 2024-06-16

## 2024-06-16 PROBLEM — Z03.71 NO LEAKAGE OF AMNIOTIC FLUID INTO VAGINA: Status: RESOLVED | Noted: 2024-06-03 | Resolved: 2024-06-16

## 2024-06-16 PROBLEM — R10.2 PELVIC PAIN: Status: RESOLVED | Noted: 2024-03-23 | Resolved: 2024-06-16

## 2024-06-16 PROBLEM — O47.00 PRETERM UTERINE CONTRACTIONS (HHS-HCC): Status: RESOLVED | Noted: 2024-04-17 | Resolved: 2024-06-16

## 2024-06-16 PROBLEM — O99.513 ASTHMA DURING PREGNANCY IN THIRD TRIMESTER (HHS-HCC): Status: ACTIVE | Noted: 2024-06-16

## 2024-06-16 LAB
BILIRUBIN, POC: NEGATIVE
BLOOD URINE, POC: NEGATIVE
CLARITY, POC: CLEAR
COLOR, POC: YELLOW
ERYTHROCYTE [DISTWIDTH] IN BLOOD BY AUTOMATED COUNT: 18.6 % (ref 11.5–14.5)
GLUCOSE URINE, POC: NORMAL
HCT VFR BLD AUTO: 27.7 % (ref 36–46)
HGB BLD-MCNC: 8.9 G/DL (ref 12–16)
HGB RETIC QN: 22 PG (ref 28–38)
IMMATURE RETIC FRACTION: 28 %
KETONES, POC: NEGATIVE
LEUKOCYTE EST, POC: NEGATIVE
MCH RBC QN AUTO: 23.2 PG (ref 26–34)
MCHC RBC AUTO-ENTMCNC: 32.1 G/DL (ref 32–36)
MCV RBC AUTO: 72 FL (ref 80–100)
NITRITE, POC: NEGATIVE
NRBC BLD-RTO: 0 /100 WBCS (ref 0–0)
PH, POC: 5.5
PLATELET # BLD AUTO: 238 X10*3/UL (ref 150–450)
POC APPEARANCE OF BODY FLUID: NORMAL
RBC # BLD AUTO: 3.84 X10*6/UL (ref 4–5.2)
RETICS #: 0.07 X10*6/UL (ref 0.02–0.08)
RETICS/RBC NFR AUTO: 1.8 % (ref 0.5–2)
SPECIFIC GRAVITY, POC: 1.01
URINE PROTEIN, POC: NEGATIVE
UROBILINOGEN, POC: 0.2
WBC # BLD AUTO: 8.8 X10*3/UL (ref 4.4–11.3)

## 2024-06-16 PROCEDURE — 59025 FETAL NON-STRESS TEST: CPT

## 2024-06-16 PROCEDURE — 99215 OFFICE O/P EST HI 40 MIN: CPT

## 2024-06-16 PROCEDURE — 36415 COLL VENOUS BLD VENIPUNCTURE: CPT

## 2024-06-16 PROCEDURE — 2500000001 HC RX 250 WO HCPCS SELF ADMINISTERED DRUGS (ALT 637 FOR MEDICARE OP)

## 2024-06-16 PROCEDURE — 99213 OFFICE O/P EST LOW 20 MIN: CPT

## 2024-06-16 PROCEDURE — 59025 FETAL NON-STRESS TEST: CPT | Mod: GC

## 2024-06-16 PROCEDURE — 85045 AUTOMATED RETICULOCYTE COUNT: CPT

## 2024-06-16 PROCEDURE — 85027 COMPLETE CBC AUTOMATED: CPT

## 2024-06-16 RX ORDER — NIFEDIPINE 10 MG/1
10 CAPSULE ORAL ONCE AS NEEDED
Status: DISCONTINUED | OUTPATIENT
Start: 2024-06-16 | End: 2024-06-16 | Stop reason: HOSPADM

## 2024-06-16 RX ORDER — LIDOCAINE HYDROCHLORIDE 10 MG/ML
0.5 INJECTION INFILTRATION; PERINEURAL ONCE AS NEEDED
Status: DISCONTINUED | OUTPATIENT
Start: 2024-06-16 | End: 2024-06-16 | Stop reason: HOSPADM

## 2024-06-16 RX ORDER — HYDRALAZINE HYDROCHLORIDE 20 MG/ML
5 INJECTION INTRAMUSCULAR; INTRAVENOUS ONCE AS NEEDED
Status: DISCONTINUED | OUTPATIENT
Start: 2024-06-16 | End: 2024-06-16 | Stop reason: HOSPADM

## 2024-06-16 RX ORDER — CYCLOBENZAPRINE HCL 10 MG
10 TABLET ORAL 3 TIMES DAILY PRN
Status: DISCONTINUED | OUTPATIENT
Start: 2024-06-16 | End: 2024-06-16 | Stop reason: HOSPADM

## 2024-06-16 RX ORDER — DIPHENHYDRAMINE HCL 25 MG
25 CAPSULE ORAL ONCE
Status: COMPLETED | OUTPATIENT
Start: 2024-06-16 | End: 2024-06-16

## 2024-06-16 RX ORDER — ACETAMINOPHEN 325 MG/1
975 TABLET ORAL ONCE
Status: DISCONTINUED | OUTPATIENT
Start: 2024-06-16 | End: 2024-06-16 | Stop reason: HOSPADM

## 2024-06-16 RX ORDER — METOCLOPRAMIDE HYDROCHLORIDE 5 MG/ML
10 INJECTION INTRAMUSCULAR; INTRAVENOUS EVERY 6 HOURS PRN
Status: DISCONTINUED | OUTPATIENT
Start: 2024-06-16 | End: 2024-06-16 | Stop reason: HOSPADM

## 2024-06-16 RX ORDER — METOCLOPRAMIDE 10 MG/1
10 TABLET ORAL EVERY 6 HOURS PRN
Status: DISCONTINUED | OUTPATIENT
Start: 2024-06-16 | End: 2024-06-16 | Stop reason: HOSPADM

## 2024-06-16 RX ORDER — LABETALOL HYDROCHLORIDE 5 MG/ML
20 INJECTION, SOLUTION INTRAVENOUS ONCE AS NEEDED
Status: DISCONTINUED | OUTPATIENT
Start: 2024-06-16 | End: 2024-06-16 | Stop reason: HOSPADM

## 2024-06-16 RX ORDER — ONDANSETRON HYDROCHLORIDE 2 MG/ML
4 INJECTION, SOLUTION INTRAVENOUS EVERY 6 HOURS PRN
Status: DISCONTINUED | OUTPATIENT
Start: 2024-06-16 | End: 2024-06-16 | Stop reason: HOSPADM

## 2024-06-16 RX ORDER — ONDANSETRON 4 MG/1
4 TABLET, FILM COATED ORAL EVERY 6 HOURS PRN
Status: DISCONTINUED | OUTPATIENT
Start: 2024-06-16 | End: 2024-06-16 | Stop reason: HOSPADM

## 2024-06-16 RX ADMIN — DIPHENHYDRAMINE HYDROCHLORIDE 25 MG: 25 CAPSULE ORAL at 04:13

## 2024-06-16 RX ADMIN — CYCLOBENZAPRINE 10 MG: 10 TABLET, FILM COATED ORAL at 04:25

## 2024-06-16 RX ADMIN — METOCLOPRAMIDE 10 MG: 10 TABLET ORAL at 04:14

## 2024-06-16 SDOH — SOCIAL STABILITY: SOCIAL INSECURITY: DOES ANYONE TRY TO KEEP YOU FROM HAVING/CONTACTING OTHER FRIENDS OR DOING THINGS OUTSIDE YOUR HOME?: NO

## 2024-06-16 SDOH — SOCIAL STABILITY: SOCIAL INSECURITY: PHYSICAL ABUSE: DENIES

## 2024-06-16 SDOH — SOCIAL STABILITY: SOCIAL INSECURITY: ABUSE SCREEN: ADULT

## 2024-06-16 SDOH — SOCIAL STABILITY: SOCIAL INSECURITY: HAVE YOU HAD THOUGHTS OF HARMING ANYONE ELSE?: NO

## 2024-06-16 SDOH — SOCIAL STABILITY: SOCIAL INSECURITY: HAVE YOU HAD ANY THOUGHTS OF HARMING ANYONE ELSE?: NO

## 2024-06-16 SDOH — SOCIAL STABILITY: SOCIAL INSECURITY: DO YOU FEEL ANYONE HAS EXPLOITED OR TAKEN ADVANTAGE OF YOU FINANCIALLY OR OF YOUR PERSONAL PROPERTY?: NO

## 2024-06-16 SDOH — HEALTH STABILITY: MENTAL HEALTH: WISH TO BE DEAD (PAST 1 MONTH): NO

## 2024-06-16 SDOH — SOCIAL STABILITY: SOCIAL INSECURITY: VERBAL ABUSE: DENIES

## 2024-06-16 SDOH — HEALTH STABILITY: MENTAL HEALTH: WERE YOU ABLE TO COMPLETE ALL THE BEHAVIORAL HEALTH SCREENINGS?: YES

## 2024-06-16 SDOH — SOCIAL STABILITY: SOCIAL INSECURITY: ARE YOU OR HAVE YOU BEEN THREATENED OR ABUSED PHYSICALLY, EMOTIONALLY, OR SEXUALLY BY ANYONE?: NO

## 2024-06-16 SDOH — SOCIAL STABILITY: SOCIAL INSECURITY: HAS ANYONE EVER THREATENED TO HURT YOUR FAMILY OR YOUR PETS?: NO

## 2024-06-16 SDOH — ECONOMIC STABILITY: HOUSING INSECURITY: DO YOU FEEL UNSAFE GOING BACK TO THE PLACE WHERE YOU ARE LIVING?: NO

## 2024-06-16 SDOH — SOCIAL STABILITY: SOCIAL INSECURITY: ARE THERE ANY APPARENT SIGNS OF INJURIES/BEHAVIORS THAT COULD BE RELATED TO ABUSE/NEGLECT?: NO

## 2024-06-16 SDOH — HEALTH STABILITY: MENTAL HEALTH: NON-SPECIFIC ACTIVE SUICIDAL THOUGHTS (PAST 1 MONTH): NO

## 2024-06-16 SDOH — HEALTH STABILITY: MENTAL HEALTH: SUICIDAL BEHAVIOR (LIFETIME): NO

## 2024-06-16 SDOH — HEALTH STABILITY: MENTAL HEALTH: HAVE YOU USED ANY SUBSTANCES (CANABIS, COCAINE, HEROIN, HALLUCINOGENS, INHALANTS, ETC.) IN THE PAST 12 MONTHS?: NO

## 2024-06-16 SDOH — HEALTH STABILITY: MENTAL HEALTH: HAVE YOU USED ANY PRESCRIPTION DRUGS OTHER THAN PRESCRIBED IN THE PAST 12 MONTHS?: NO

## 2024-06-16 ASSESSMENT — LIFESTYLE VARIABLES
HOW MANY STANDARD DRINKS CONTAINING ALCOHOL DO YOU HAVE ON A TYPICAL DAY: PATIENT DOES NOT DRINK
AUDIT-C TOTAL SCORE: 0
AUDIT-C TOTAL SCORE: 0
HOW OFTEN DO YOU HAVE A DRINK CONTAINING ALCOHOL: NEVER
HOW OFTEN DO YOU HAVE 6 OR MORE DRINKS ON ONE OCCASION: NEVER
SKIP TO QUESTIONS 9-10: 1

## 2024-06-16 ASSESSMENT — PAIN SCALES - GENERAL
PAINLEVEL_OUTOF10: 7
PAINLEVEL_OUTOF10: 6
PAINLEVEL_OUTOF10: 5 - MODERATE PAIN
PAINLEVEL_OUTOF10: 7

## 2024-06-16 NOTE — ED TRIAGE NOTES
Pt to ED c/o nosebleed, for about 30 minutes prior to arrival. Pt also c/o abd cramping since yesterday, denies vomiting endorsing nausea. Pt is 38 weeks pregnant, due , ,

## 2024-06-16 NOTE — H&P
Obstetrical Admission History and Physical - Triage    Chief Complaint: Hypertension    Assessment/Plan     Job Metz is a 23 y.o.  at 38w2d by LMP c/w 15wk us here for elevated BP at home and contractions.    Elevated BP  - Hx of gHTN in last pregnancy  - Patient reporting SBP in 150s at home. Does not have BP cuff for comparison.   - BP in triage and on chart review normotensive    HA  - 6/10 HA unrelieved with Flexeril, Reglan, and Benadryl. Patient declined Tylenol  - Improved with sleep    R/O labor  - Starting cervical exam: /-3. Was 2 cm in the office last Wednesday  - Contractions q10 min on TOCO  - Flexeril and PO hydration encouraged  - Unchanged on 2 hour recheck.     Anemia  - hgb 9.8 last on   - per prenatal note from , planned to check CBC and reticulocytes, however, neither were collected. Collected today with hgb of 8.9, reticulocytes normal  - For routine CBC and T&C during induction    Fetal Well-Being  - Reactive NST  - Recommended patient follow up with prenatal provider as scheduled. Has appointment with Dr. Santoro tomorrow    Dispo: Will discharge home, and instruct to return to L&D with any signs/symptoms of labor. Patient comfortable with plan. Patient to be induced on     Discussed with Dr. Jordy Longoria MD, PGY-1      Subjective   Job presented for initial concern of epistaxis, that resolved prior to presentation to triage. She states she took her blood pressure at home and noted an SBP of 158. She retook her BP twice with similar readings. No BP >=160/110. She has also had a 6/10 throbbing headache. She has a history of headache in pregnancy and does not treat them with medication. Denies vision changes, CP, SOB, or RUQ pain.    She has also been having contractions q10-20 minutes that are uncomfortable and would like a cervical check. No VB, LOF. Good fetal movement.    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 90 tablet 3 6/15/2024    acetaminophen (Tylenol) 325 mg tablet Take 3 tablets (975 mg) by mouth every 6 hours if needed for mild pain (1 - 3). 60 tablet 0 Unknown    cyclobenzaprine (Flexeril) 10 mg tablet Take 1 tablet (10 mg) by mouth 3 times a day as needed for muscle spasms. 8 tablet 0 Unknown    doxylamine (Unisom, doxylamine,) 25 mg tablet Take 1 tablet (25 mg) by mouth as needed at bedtime for sleep or nausea. 30 tablet 0     ferrous fumarate-vitamin B12-vitamin C-folic acid (Foltrin) 110-0.5 mg capsule Take 1 capsule by mouth 2 times a day before meals. 60 capsule 11 Unknown    ferrous sulfate 8.8 mg/mL elemental iron elixir Please take 10 ml orally twice daily 600 mL 2     metoclopramide (Reglan) 10 mg tablet Take 1 tablet (10 mg) by mouth every 8 hours if needed (nausea and vomiting) for up to 7 days. 21 tablet 0         Objective    Last Vitals  Temp Pulse Resp BP MAP O2 Sat   36.3 °C (97.3 °F) 76 16 114/56   100 %     Physical Examination  General: Lying in bed in NAD  Skin: No rashes/lesions/erythema  Neuro: Awake, alert, conversational  CV: Regular rate  Respiratory: Even and unlabored on RA  Extremities: No edema, discoloration, or pain in BLE  Psych: appropriate mood and affect    Cervical Exam  Dilation: 4  Effacement (%): 50  Fetal Station: -3  Method: Manual  OB Examiner: Swati ELLIS  Fetal Assessment  Movement: Present  Mode: External US  Baseline Fetal Heart Rate (bpm): 125 bpm  Baseline Classification: Normal  Variability: Moderate (Between 6 and 25 BPM)  Pattern: Accelerations   Fetal Decelerations: No  Contraction Frequency: occasional    Lab Review  Lab Results   Component Value Date    WBC 8.8 06/16/2024    HGB 8.9 (L) 06/16/2024    HCT 27.7 (L) 06/16/2024     06/16/2024

## 2024-06-17 ENCOUNTER — APPOINTMENT (OUTPATIENT)
Dept: OBSTETRICS AND GYNECOLOGY | Facility: CLINIC | Age: 24
End: 2024-06-17
Payer: COMMERCIAL

## 2024-06-17 VITALS — BODY MASS INDEX: 28.25 KG/M2 | WEIGHT: 185.8 LBS | DIASTOLIC BLOOD PRESSURE: 76 MMHG | SYSTOLIC BLOOD PRESSURE: 122 MMHG

## 2024-06-17 DIAGNOSIS — F50.89: ICD-10-CM

## 2024-06-17 DIAGNOSIS — J45.909 ASTHMA DURING PREGNANCY IN THIRD TRIMESTER (HHS-HCC): ICD-10-CM

## 2024-06-17 DIAGNOSIS — O99.013 ANEMIA AFFECTING PREGNANCY IN THIRD TRIMESTER (HHS-HCC): ICD-10-CM

## 2024-06-17 DIAGNOSIS — O99.513 ASTHMA DURING PREGNANCY IN THIRD TRIMESTER (HHS-HCC): ICD-10-CM

## 2024-06-17 DIAGNOSIS — O21.9 NAUSEA AND VOMITING IN PREGNANCY PRIOR TO 22 WEEKS GESTATION (HHS-HCC): ICD-10-CM

## 2024-06-17 DIAGNOSIS — O09.32 INSUFFICIENT PRENATAL CARE IN SECOND TRIMESTER (HHS-HCC): ICD-10-CM

## 2024-06-17 DIAGNOSIS — O09.893 SHORT INTERVAL BETWEEN PREGNANCIES AFFECTING PREGNANCY IN THIRD TRIMESTER, ANTEPARTUM (HHS-HCC): Primary | ICD-10-CM

## 2024-06-17 PROCEDURE — 0501F PRENATAL FLOW SHEET: CPT | Performed by: OBSTETRICS & GYNECOLOGY

## 2024-06-17 NOTE — PROGRESS NOTES
GBS neg.  Persistent anemia with inadequate response to oral iron: for IV iron postpartum.  Pt scheduled for IOL on 6/21.  Desired membrane sweep today.    Deanna Santoro MD

## 2024-06-19 ENCOUNTER — APPOINTMENT (OUTPATIENT)
Dept: OBSTETRICS AND GYNECOLOGY | Facility: CLINIC | Age: 24
End: 2024-06-19
Payer: COMMERCIAL

## 2024-06-21 ENCOUNTER — APPOINTMENT (OUTPATIENT)
Dept: OBSTETRICS AND GYNECOLOGY | Facility: HOSPITAL | Age: 24
End: 2024-06-21
Payer: COMMERCIAL

## 2024-06-21 ENCOUNTER — ANESTHESIA EVENT (OUTPATIENT)
Dept: OBSTETRICS AND GYNECOLOGY | Facility: HOSPITAL | Age: 24
End: 2024-06-21
Payer: COMMERCIAL

## 2024-06-21 ENCOUNTER — ANESTHESIA (OUTPATIENT)
Dept: OBSTETRICS AND GYNECOLOGY | Facility: HOSPITAL | Age: 24
End: 2024-06-21
Payer: COMMERCIAL

## 2024-06-21 ENCOUNTER — HOSPITAL ENCOUNTER (INPATIENT)
Facility: HOSPITAL | Age: 24
LOS: 2 days | Discharge: HOME | End: 2024-06-23
Attending: OBSTETRICS & GYNECOLOGY | Admitting: OBSTETRICS & GYNECOLOGY
Payer: COMMERCIAL

## 2024-06-21 DIAGNOSIS — O13.3 GESTATIONAL HYPERTENSION, THIRD TRIMESTER (HHS-HCC): ICD-10-CM

## 2024-06-21 DIAGNOSIS — Z34.90 PREGNANCY (HHS-HCC): ICD-10-CM

## 2024-06-21 DIAGNOSIS — Z64.1 MULTIGRAVIDA: ICD-10-CM

## 2024-06-21 DIAGNOSIS — R10.2 PELVIC PAIN AFFECTING PREGNANCY IN SECOND TRIMESTER, ANTEPARTUM (HHS-HCC): ICD-10-CM

## 2024-06-21 DIAGNOSIS — O26.892 PELVIC PAIN AFFECTING PREGNANCY IN SECOND TRIMESTER, ANTEPARTUM (HHS-HCC): ICD-10-CM

## 2024-06-21 PROBLEM — Z3A.39 39 WEEKS GESTATION OF PREGNANCY (HHS-HCC): Status: ACTIVE | Noted: 2024-06-21

## 2024-06-21 PROBLEM — O21.9 NAUSEA AND VOMITING IN PREGNANCY PRIOR TO 22 WEEKS GESTATION (HHS-HCC): Status: RESOLVED | Noted: 2023-11-02 | Resolved: 2024-06-21

## 2024-06-21 PROBLEM — F50.83: Status: RESOLVED | Noted: 2024-04-09 | Resolved: 2024-06-21

## 2024-06-21 PROBLEM — F50.89: Status: RESOLVED | Noted: 2024-04-09 | Resolved: 2024-06-21

## 2024-06-21 PROBLEM — O09.32 INSUFFICIENT PRENATAL CARE IN SECOND TRIMESTER (HHS-HCC): Status: RESOLVED | Noted: 2024-03-12 | Resolved: 2024-06-21

## 2024-06-21 PROBLEM — A59.9 TRICHOMONIASIS: Status: RESOLVED | Noted: 2024-06-21 | Resolved: 2024-06-21

## 2024-06-21 PROBLEM — M41.9 SCOLIOSIS: Status: ACTIVE | Noted: 2024-06-21

## 2024-06-21 LAB
ABO GROUP (TYPE) IN BLOOD: NORMAL
ANTIBODY SCREEN: NORMAL
ERYTHROCYTE [DISTWIDTH] IN BLOOD BY AUTOMATED COUNT: 19.1 % (ref 11.5–14.5)
HCT VFR BLD AUTO: 30 % (ref 36–46)
HGB BLD-MCNC: 9.6 G/DL (ref 12–16)
MCH RBC QN AUTO: 23 PG (ref 26–34)
MCHC RBC AUTO-ENTMCNC: 32 G/DL (ref 32–36)
MCV RBC AUTO: 72 FL (ref 80–100)
NRBC BLD-RTO: 0 /100 WBCS (ref 0–0)
PLATELET # BLD AUTO: 269 X10*3/UL (ref 150–450)
RBC # BLD AUTO: 4.18 X10*6/UL (ref 4–5.2)
RH FACTOR (ANTIGEN D): NORMAL
TREPONEMA PALLIDUM IGG+IGM AB [PRESENCE] IN SERUM OR PLASMA BY IMMUNOASSAY: NONREACTIVE
WBC # BLD AUTO: 8.7 X10*3/UL (ref 4.4–11.3)

## 2024-06-21 PROCEDURE — 86901 BLOOD TYPING SEROLOGIC RH(D): CPT | Performed by: OBSTETRICS & GYNECOLOGY

## 2024-06-21 PROCEDURE — 2500000004 HC RX 250 GENERAL PHARMACY W/ HCPCS (ALT 636 FOR OP/ED): Performed by: NURSE ANESTHETIST, CERTIFIED REGISTERED

## 2024-06-21 PROCEDURE — 2500000004 HC RX 250 GENERAL PHARMACY W/ HCPCS (ALT 636 FOR OP/ED): Performed by: OBSTETRICS & GYNECOLOGY

## 2024-06-21 PROCEDURE — 59409 OBSTETRICAL CARE: CPT | Performed by: OBSTETRICS & GYNECOLOGY

## 2024-06-21 PROCEDURE — 2500000005 HC RX 250 GENERAL PHARMACY W/O HCPCS: Performed by: OBSTETRICS & GYNECOLOGY

## 2024-06-21 PROCEDURE — 86780 TREPONEMA PALLIDUM: CPT | Mod: AHULAB | Performed by: OBSTETRICS & GYNECOLOGY

## 2024-06-21 PROCEDURE — 36415 COLL VENOUS BLD VENIPUNCTURE: CPT | Performed by: OBSTETRICS & GYNECOLOGY

## 2024-06-21 PROCEDURE — 3E033VJ INTRODUCTION OF OTHER HORMONE INTO PERIPHERAL VEIN, PERCUTANEOUS APPROACH: ICD-10-PCS | Performed by: OBSTETRICS & GYNECOLOGY

## 2024-06-21 PROCEDURE — 51701 INSERT BLADDER CATHETER: CPT

## 2024-06-21 PROCEDURE — 7100000016 HC LABOR RECOVERY PER HOUR

## 2024-06-21 PROCEDURE — 59050 FETAL MONITOR W/REPORT: CPT

## 2024-06-21 PROCEDURE — 3700000001 HC GENERAL ANESTHESIA TIME - INITIAL BASE CHARGE: Performed by: NURSE ANESTHETIST, CERTIFIED REGISTERED

## 2024-06-21 PROCEDURE — 3700000002 HC GENERAL ANESTHESIA TIME - EACH INCREMENTAL 1 MINUTE: Performed by: NURSE ANESTHETIST, CERTIFIED REGISTERED

## 2024-06-21 PROCEDURE — 1120000001 HC OB PRIVATE ROOM DAILY

## 2024-06-21 PROCEDURE — 86920 COMPATIBILITY TEST SPIN: CPT

## 2024-06-21 PROCEDURE — 10907ZC DRAINAGE OF AMNIOTIC FLUID, THERAPEUTIC FROM PRODUCTS OF CONCEPTION, VIA NATURAL OR ARTIFICIAL OPENING: ICD-10-PCS | Performed by: OBSTETRICS & GYNECOLOGY

## 2024-06-21 PROCEDURE — 59410 OBSTETRICAL CARE: CPT | Performed by: OBSTETRICS & GYNECOLOGY

## 2024-06-21 PROCEDURE — 2500000001 HC RX 250 WO HCPCS SELF ADMINISTERED DRUGS (ALT 637 FOR MEDICARE OP): Performed by: OBSTETRICS & GYNECOLOGY

## 2024-06-21 PROCEDURE — 7210000002 HC LABOR PER HOUR

## 2024-06-21 PROCEDURE — 85027 COMPLETE CBC AUTOMATED: CPT | Performed by: OBSTETRICS & GYNECOLOGY

## 2024-06-21 RX ORDER — OXYTOCIN/0.9 % SODIUM CHLORIDE 30/500 ML
60 PLASTIC BAG, INJECTION (ML) INTRAVENOUS ONCE AS NEEDED
Status: DISCONTINUED | OUTPATIENT
Start: 2024-06-21 | End: 2024-06-23 | Stop reason: HOSPADM

## 2024-06-21 RX ORDER — LIDOCAINE HYDROCHLORIDE 10 MG/ML
30 INJECTION INFILTRATION; PERINEURAL ONCE AS NEEDED
Status: COMPLETED | OUTPATIENT
Start: 2024-06-21 | End: 2024-06-21

## 2024-06-21 RX ORDER — CARBOPROST TROMETHAMINE 250 UG/ML
250 INJECTION, SOLUTION INTRAMUSCULAR ONCE AS NEEDED
Status: DISCONTINUED | OUTPATIENT
Start: 2024-06-21 | End: 2024-06-23 | Stop reason: HOSPADM

## 2024-06-21 RX ORDER — OXYTOCIN 10 [USP'U]/ML
10 INJECTION, SOLUTION INTRAMUSCULAR; INTRAVENOUS ONCE AS NEEDED
Status: DISCONTINUED | OUTPATIENT
Start: 2024-06-21 | End: 2024-06-23 | Stop reason: HOSPADM

## 2024-06-21 RX ORDER — TRANEXAMIC ACID 100 MG/ML
1000 INJECTION, SOLUTION INTRAVENOUS ONCE AS NEEDED
Status: DISCONTINUED | OUTPATIENT
Start: 2024-06-21 | End: 2024-06-23 | Stop reason: HOSPADM

## 2024-06-21 RX ORDER — OXYTOCIN/0.9 % SODIUM CHLORIDE 30/500 ML
2-30 PLASTIC BAG, INJECTION (ML) INTRAVENOUS CONTINUOUS
Status: DISCONTINUED | OUTPATIENT
Start: 2024-06-21 | End: 2024-06-23 | Stop reason: HOSPADM

## 2024-06-21 RX ORDER — SODIUM CHLORIDE, SODIUM LACTATE, POTASSIUM CHLORIDE, CALCIUM CHLORIDE 600; 310; 30; 20 MG/100ML; MG/100ML; MG/100ML; MG/100ML
125 INJECTION, SOLUTION INTRAVENOUS CONTINUOUS
Status: DISCONTINUED | OUTPATIENT
Start: 2024-06-21 | End: 2024-06-23 | Stop reason: HOSPADM

## 2024-06-21 RX ORDER — NIFEDIPINE 10 MG/1
10 CAPSULE ORAL ONCE AS NEEDED
Status: DISCONTINUED | OUTPATIENT
Start: 2024-06-21 | End: 2024-06-23 | Stop reason: HOSPADM

## 2024-06-21 RX ORDER — FENTANYL/ROPIVACAINE/NS/PF 2MCG/ML-.2
0-25 PLASTIC BAG, INJECTION (ML) INJECTION CONTINUOUS
Status: DISCONTINUED | OUTPATIENT
Start: 2024-06-21 | End: 2024-06-23 | Stop reason: HOSPADM

## 2024-06-21 RX ORDER — IBUPROFEN 600 MG/1
600 TABLET ORAL EVERY 6 HOURS
Status: DISCONTINUED | OUTPATIENT
Start: 2024-06-21 | End: 2024-06-23 | Stop reason: HOSPADM

## 2024-06-21 RX ORDER — DIPHENHYDRAMINE HCL 25 MG
25 CAPSULE ORAL EVERY 6 HOURS PRN
Status: DISCONTINUED | OUTPATIENT
Start: 2024-06-21 | End: 2024-06-23 | Stop reason: HOSPADM

## 2024-06-21 RX ORDER — METOCLOPRAMIDE HYDROCHLORIDE 5 MG/ML
10 INJECTION INTRAMUSCULAR; INTRAVENOUS EVERY 6 HOURS PRN
Status: DISCONTINUED | OUTPATIENT
Start: 2024-06-21 | End: 2024-06-23 | Stop reason: HOSPADM

## 2024-06-21 RX ORDER — METHYLERGONOVINE MALEATE 0.2 MG/ML
0.2 INJECTION INTRAVENOUS ONCE AS NEEDED
Status: DISCONTINUED | OUTPATIENT
Start: 2024-06-21 | End: 2024-06-23 | Stop reason: HOSPADM

## 2024-06-21 RX ORDER — LOPERAMIDE HYDROCHLORIDE 2 MG/1
4 CAPSULE ORAL EVERY 2 HOUR PRN
Status: DISCONTINUED | OUTPATIENT
Start: 2024-06-21 | End: 2024-06-23 | Stop reason: HOSPADM

## 2024-06-21 RX ORDER — ONDANSETRON HYDROCHLORIDE 2 MG/ML
4 INJECTION, SOLUTION INTRAVENOUS EVERY 6 HOURS PRN
Status: DISCONTINUED | OUTPATIENT
Start: 2024-06-21 | End: 2024-06-23 | Stop reason: HOSPADM

## 2024-06-21 RX ORDER — METOCLOPRAMIDE 10 MG/1
10 TABLET ORAL EVERY 6 HOURS PRN
Status: DISCONTINUED | OUTPATIENT
Start: 2024-06-21 | End: 2024-06-23 | Stop reason: HOSPADM

## 2024-06-21 RX ORDER — SIMETHICONE 80 MG
80 TABLET,CHEWABLE ORAL 4 TIMES DAILY PRN
Status: DISCONTINUED | OUTPATIENT
Start: 2024-06-21 | End: 2024-06-23 | Stop reason: HOSPADM

## 2024-06-21 RX ORDER — HYDRALAZINE HYDROCHLORIDE 20 MG/ML
5 INJECTION INTRAMUSCULAR; INTRAVENOUS ONCE AS NEEDED
Status: DISCONTINUED | OUTPATIENT
Start: 2024-06-21 | End: 2024-06-23 | Stop reason: HOSPADM

## 2024-06-21 RX ORDER — FERROUS SULFATE 15 MG/ML
90 DROPS ORAL DAILY
Status: DISCONTINUED | OUTPATIENT
Start: 2024-06-21 | End: 2024-06-23

## 2024-06-21 RX ORDER — TERBUTALINE SULFATE 1 MG/ML
0.25 INJECTION SUBCUTANEOUS ONCE AS NEEDED
Status: DISCONTINUED | OUTPATIENT
Start: 2024-06-21 | End: 2024-06-23 | Stop reason: HOSPADM

## 2024-06-21 RX ORDER — DIPHENHYDRAMINE HYDROCHLORIDE 50 MG/ML
25 INJECTION INTRAMUSCULAR; INTRAVENOUS EVERY 6 HOURS PRN
Status: DISCONTINUED | OUTPATIENT
Start: 2024-06-21 | End: 2024-06-23 | Stop reason: HOSPADM

## 2024-06-21 RX ORDER — ONDANSETRON 4 MG/1
4 TABLET, FILM COATED ORAL EVERY 6 HOURS PRN
Status: DISCONTINUED | OUTPATIENT
Start: 2024-06-21 | End: 2024-06-23 | Stop reason: HOSPADM

## 2024-06-21 RX ORDER — MISOPROSTOL 200 UG/1
800 TABLET ORAL ONCE AS NEEDED
Status: DISCONTINUED | OUTPATIENT
Start: 2024-06-21 | End: 2024-06-23 | Stop reason: HOSPADM

## 2024-06-21 RX ORDER — OXYTOCIN/0.9 % SODIUM CHLORIDE 30/500 ML
60 PLASTIC BAG, INJECTION (ML) INTRAVENOUS ONCE AS NEEDED
Status: COMPLETED | OUTPATIENT
Start: 2024-06-21 | End: 2024-06-21

## 2024-06-21 RX ORDER — ACETAMINOPHEN 325 MG/1
975 TABLET ORAL EVERY 6 HOURS
Status: DISCONTINUED | OUTPATIENT
Start: 2024-06-21 | End: 2024-06-23 | Stop reason: HOSPADM

## 2024-06-21 RX ORDER — POLYETHYLENE GLYCOL 3350 17 G/17G
17 POWDER, FOR SOLUTION ORAL 2 TIMES DAILY PRN
Status: DISCONTINUED | OUTPATIENT
Start: 2024-06-21 | End: 2024-06-23 | Stop reason: HOSPADM

## 2024-06-21 RX ORDER — LABETALOL HYDROCHLORIDE 5 MG/ML
20 INJECTION, SOLUTION INTRAVENOUS ONCE AS NEEDED
Status: DISCONTINUED | OUTPATIENT
Start: 2024-06-21 | End: 2024-06-23 | Stop reason: HOSPADM

## 2024-06-21 RX ORDER — LIDOCAINE 560 MG/1
1 PATCH PERCUTANEOUS; TOPICAL; TRANSDERMAL
Status: DISCONTINUED | OUTPATIENT
Start: 2024-06-21 | End: 2024-06-23 | Stop reason: HOSPADM

## 2024-06-21 RX ORDER — BISACODYL 10 MG/1
10 SUPPOSITORY RECTAL DAILY PRN
Status: DISCONTINUED | OUTPATIENT
Start: 2024-06-21 | End: 2024-06-23 | Stop reason: HOSPADM

## 2024-06-21 RX ORDER — ADHESIVE BANDAGE
10 BANDAGE TOPICAL
Status: DISCONTINUED | OUTPATIENT
Start: 2024-06-21 | End: 2024-06-23 | Stop reason: HOSPADM

## 2024-06-21 SDOH — SOCIAL STABILITY: SOCIAL INSECURITY: HAVE YOU HAD THOUGHTS OF HARMING ANYONE ELSE?: NO

## 2024-06-21 SDOH — SOCIAL STABILITY: SOCIAL INSECURITY: PHYSICAL ABUSE: DENIES

## 2024-06-21 SDOH — SOCIAL STABILITY: SOCIAL INSECURITY: HAS ANYONE EVER THREATENED TO HURT YOUR FAMILY OR YOUR PETS?: NO

## 2024-06-21 SDOH — SOCIAL STABILITY: SOCIAL INSECURITY: ARE YOU OR HAVE YOU BEEN THREATENED OR ABUSED PHYSICALLY, EMOTIONALLY, OR SEXUALLY BY ANYONE?: NO

## 2024-06-21 SDOH — SOCIAL STABILITY: SOCIAL INSECURITY: VERBAL ABUSE: DENIES

## 2024-06-21 SDOH — ECONOMIC STABILITY: HOUSING INSECURITY: DO YOU FEEL UNSAFE GOING BACK TO THE PLACE WHERE YOU ARE LIVING?: NO

## 2024-06-21 SDOH — HEALTH STABILITY: MENTAL HEALTH: WISH TO BE DEAD (PAST 1 MONTH): NO

## 2024-06-21 SDOH — SOCIAL STABILITY: SOCIAL INSECURITY: DOES ANYONE TRY TO KEEP YOU FROM HAVING/CONTACTING OTHER FRIENDS OR DOING THINGS OUTSIDE YOUR HOME?: NO

## 2024-06-21 SDOH — HEALTH STABILITY: MENTAL HEALTH: NON-SPECIFIC ACTIVE SUICIDAL THOUGHTS (PAST 1 MONTH): NO

## 2024-06-21 SDOH — HEALTH STABILITY: MENTAL HEALTH: CURRENT SMOKER: 0

## 2024-06-21 SDOH — SOCIAL STABILITY: SOCIAL INSECURITY: DO YOU FEEL ANYONE HAS EXPLOITED OR TAKEN ADVANTAGE OF YOU FINANCIALLY OR OF YOUR PERSONAL PROPERTY?: NO

## 2024-06-21 SDOH — SOCIAL STABILITY: SOCIAL INSECURITY: ABUSE SCREEN: ADULT

## 2024-06-21 SDOH — HEALTH STABILITY: MENTAL HEALTH: SUICIDAL BEHAVIOR (LIFETIME): NO

## 2024-06-21 SDOH — SOCIAL STABILITY: SOCIAL INSECURITY: ARE THERE ANY APPARENT SIGNS OF INJURIES/BEHAVIORS THAT COULD BE RELATED TO ABUSE/NEGLECT?: NO

## 2024-06-21 SDOH — SOCIAL STABILITY: SOCIAL INSECURITY: HAVE YOU HAD ANY THOUGHTS OF HARMING ANYONE ELSE?: NO

## 2024-06-21 SDOH — HEALTH STABILITY: MENTAL HEALTH: WERE YOU ABLE TO COMPLETE ALL THE BEHAVIORAL HEALTH SCREENINGS?: YES

## 2024-06-21 ASSESSMENT — LIFESTYLE VARIABLES
AUDIT-C TOTAL SCORE: 0
HOW MANY STANDARD DRINKS CONTAINING ALCOHOL DO YOU HAVE ON A TYPICAL DAY: PATIENT DOES NOT DRINK
HOW OFTEN DO YOU HAVE A DRINK CONTAINING ALCOHOL: NEVER
AUDIT-C TOTAL SCORE: 0
HOW OFTEN DO YOU HAVE 6 OR MORE DRINKS ON ONE OCCASION: NEVER
SKIP TO QUESTIONS 9-10: 1

## 2024-06-21 ASSESSMENT — PAIN SCALES - GENERAL
PAINLEVEL_OUTOF10: 0 - NO PAIN
PAINLEVEL_OUTOF10: 0 - NO PAIN

## 2024-06-21 NOTE — ANESTHESIA PREPROCEDURE EVALUATION
Patient: Job Metz    Evaluation Method: In-person visit    Procedure Information    Date: 24  Procedure: Labor Consult     Pt  @ 39 weeks admitted for term IOL. Prior delivery VAVD.    Relevant Problems   Anesthesia (within normal limits)      Cardiac (within normal limits)      Pulmonary  Childhood asthma. No albuterol use for years.   (+) Asthma during pregnancy in third trimester ([facility])      Neuro (within normal limits)      GI (within normal limits)      /Renal (within normal limits)      Liver (within normal limits)      Endocrine (within normal limits)      Hematology   (+) Anemia affecting pregnancy in third trimester ([facility])      Musculoskeletal   (+) Scoliosis      GYN   (+) 39 weeks gestation of pregnancy ([facility])   (+) Short interval between pregnancies affecting pregnancy in third trimester, antepartum ([facility])       Clinical information reviewed:   Tobacco  Allergies  Meds  Problems  Med Hx  Surg Hx   Fam Hx  Soc   Hx        NPO Detail:  No data recorded     OB/Gyn Evaluation    Present Pregnancy    Patient is pregnant now.   Obstetric History                Physical Exam    Airway  Mallampati: III  TM distance: >3 FB  Neck ROM: full     Cardiovascular    Dental    Pulmonary    Abdominal            Anesthesia Plan    History of general anesthesia?: no  History of complications of general anesthesia?: unknown/emergency    ASA 2   (I informed and discussed the risks and benefits of general, spinal and epidural anesthesia with the patient.  The patient expressed her understanding and her questions were answered.  A verbal consent was given by the patient.   )  The patient is not a current smoker.    Anesthetic plan and risks discussed with patient.  Use of blood products discussed with patient who consented to blood products.

## 2024-06-21 NOTE — ANESTHESIA PREPROCEDURE EVALUATION
Patient: Job Metz    Evaluation Method: In-person visit    Procedure Information    Date: 24  Procedure: Labor Consult     Pt  @ 39 weeks admitted for term IOL. Prior delivery VAVD.    Relevant Problems   Anesthesia (within normal limits)      Cardiac (within normal limits)      Pulmonary  Childhood asthma. No albuterol use for years.   (+) Asthma during pregnancy in third trimester ([facility])      Neuro (within normal limits)      GI (within normal limits)      /Renal (within normal limits)      Liver (within normal limits)      Endocrine (within normal limits)      Hematology   (+) Anemia affecting pregnancy in third trimester ([facility])      Musculoskeletal   (+) Scoliosis      GYN   (+) 39 weeks gestation of pregnancy ([facility])   (+) Short interval between pregnancies affecting pregnancy in third trimester, antepartum ([facility])       Clinical information reviewed:   Tobacco  Allergies  Meds  Problems  Med Hx  Surg Hx   Fam Hx  Soc   Hx        NPO Detail:  No data recorded     OB/Gyn Evaluation    Present Pregnancy    Patient is pregnant now.   Obstetric History            Physical Exam    Airway  Mallampati: III  TM distance: >3 FB  Neck ROM: full     Cardiovascular    Dental    Pulmonary    Abdominal          Anesthesia Plan    History of general anesthesia?: no  History of complications of general anesthesia?: unknown/emergency    ASA 2     epidural   (I informed and discussed the risks and benefits of general, spinal and epidural anesthesia with the patient.  The patient expressed her understanding and her questions were answered.  A verbal consent was given by the patient.   )  The patient is not a current smoker.    Anesthetic plan and risks discussed with patient.  Use of blood products discussed with patient who consented to blood products.

## 2024-06-21 NOTE — H&P
Obstetrical Admission History and Physical     Job Metz is a 23 y.o.  at 39w0d. MICHAEL: 2024, by Last Menstrual Period. Estimated fetal weight: . She has had prenatal care with Dr. Santoro.    Chief Complaint: Induction of labor    Assessment/Plan    24yo  at 39w0d here for scheduled induction of labor  -CBC, type and screen  -IVF, CEFM  -Pitocin per protocol  -GBS negative  -Epidural upon request    Chronic anemia  -Will type and cross per guidelines  -Discussed increased risk of transfusion with patient    Principal Problem:    39 weeks gestation of pregnancy ([facility])  Active Problems:    History of vacuum extraction assisted delivery    Anemia affecting pregnancy in third trimester ([facility])      Pregnancy Problems (from 22 to present)       Problem Noted Resolved    39 weeks gestation of pregnancy ([facility]) 2024 by Chester Francois MD No    Priority:  Medium      Asthma during pregnancy in third trimester ([facility]) 2024 by Lauren Longoria MD No    Priority:  Medium      Overview Signed 2024  6:04 AM by Lauren Longoria MD     History of childhood asthma with intubation  No inhaler use this pregnancy, but has albuterol at home         Anemia affecting pregnancy in third trimester ([facility]) 2024 by Deanna Santoro MD No    Priority:  Medium      Overview Signed 2024  4:48 AM by Lauren Longoria MD     3/12/2024  Second trimester h/h 10.1/29.9. iron BID sent to pharmacy. Plan to repeat labs in 2-3 weeks. SGP            Short interval between pregnancies affecting pregnancy in third trimester, antepartum ([facility]) 2024 by Deanna Santoro MD No    Priority:  Medium      Pica, in adults 2024 by LEIGH Dawn No    Priority:  Medium      Overview Signed 2024  2:57 PM by LEIGH Dawn     2024  Patient ijndicates that she is eating cardboard very little bits every day. Will retest anemia panel. SGP          Insufficient prenatal care in second trimester ([facility]) 3/12/2024 by LEIGH Dawn No    Priority:  Medium      Overview Addendum 3/12/2024  8:51 PM by LEIGH Dawn     3/12/2024  Patient seen for initial prenatal visit at 5w5d, did present for anatomy scan. Next visit was at 24 weeks. Prenatal panel reordered at that visit with one hour glucose. Reviewed importance of keeping appointments. SGP         Nausea and vomiting in pregnancy prior to 22 weeks gestation ([facility]) 2023 by Deanna Santoro MD No    Priority:  Medium      Overview Signed 2023  7:37 PM by Deanna Santoro MD     B6, Unisom, Zofran         Uterine contractions during pregnancy ([facility]) 6/3/2024 by THERESA Price-CNP 2024 by Lauren Longoria MD    Priority:  Medium       uterine contractions ([facility]) 2024 by Deanna Santoro MD 2024 by Lauren Longoria MD    Priority:  Medium      Anemia of mother in pregnancy, delivered with postpartum condition ([facility]) 3/12/2024 by LEIGH Dawn 2024 by Lauren Longoria MD    Priority:  Medium      Overview Addendum 3/12/2024  8:53 PM by LEIGH Dawn     3/12/2024  Second trimester h/h 10.1/29.9. iron BID sent to pharmacy. Plan to repeat labs in 2-3 weeks. SGP                 Options for delivery have been discussed with the patient and she elects for an induction of labor.  Cervical ripening with cytotec, cervidil, other prostaglandin agents has been discussed.  Induction of labor with pitocin, amniotomy, cytotec, and cervical balloon have been discussed in detail. The risks, benefits, complications, alternatives, expected outcomes, potential problems during recuperation and recovery, and the risks of not performing the procedure were discussed with the patient. The patient stated understanding that the risks of delivery include, but are not limited to: death; reaction to  medications; injury to bowel, bladder, ureters, uterus, cervix, vagina, and other pelvic and abdominal structures, infection; blood loss and possible need for transfusion; and potential need for surgery, including hysterectomy. The risks of injury to the infant during delivery were also discussed. All questions were answered. There was concurrence with the planned procedure, and the patient wanted to proceed.    Admit to inpatient status. I anticipate that this patient will require a stay exceeding at least 2 midnights for delivery and postpartum.  Induction of labor.  Management of pregnancy complications, as indicated.    Subjective   This is a 24yo  at 39w0d who presents for scheduled induction of labor.  This pregnancy has been notable for:  -Anemia, most recent hemoglobin 8.9  -h/o GHTN  -Short interval pregnancy  -Mild intermittent asthma    Reason for Induction of Labor:  Pregnancy at 39 weeks or greater for induction         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    Postinflammatory hyperpigmentation 2016    Trichomoniasis 2024        Past Surgical History   Past Surgical History:   Procedure Laterality Date    BARTHOLIN GLAND CYST EXCISION Left 2023       Social History  Social History     Tobacco Use    Smoking status: Never    Smokeless tobacco: Never   Substance Use Topics    Alcohol use: Never     Substance and Sexual Activity   Drug Use Never       Allergies  Latex and Other     Medications  Medications Prior to Admission   Medication Sig Dispense Refill Last Dose    acetaminophen  (Tylenol) 325 mg tablet Take 3 tablets (975 mg) by mouth every 6 hours if needed for mild pain (1 - 3). 60 tablet 0     cyclobenzaprine (Flexeril) 10 mg tablet Take 1 tablet (10 mg) by mouth 3 times a day as needed for muscle spasms. 8 tablet 0     doxylamine (Unisom, doxylamine,) 25 mg tablet Take 1 tablet (25 mg) by mouth as needed at bedtime for sleep or nausea. 30 tablet 0     ferrous fumarate-vitamin B12-vitamin C-folic acid (Foltrin) 110-0.5 mg capsule Take 1 capsule by mouth 2 times a day before meals. 60 capsule 11     ferrous sulfate 8.8 mg/mL elemental iron elixir Please take 10 ml orally twice daily 600 mL 2     metoclopramide (Reglan) 10 mg tablet Take 1 tablet (10 mg) by mouth every 8 hours if needed (nausea and vomiting) for up to 7 days. 21 tablet 0     prenatal vitamin, iron-folic, 27 mg iron-800 mcg folic acid tablet Take 1 tablet by mouth once daily. 90 tablet 3        Objective    Last Vitals  Temp Pulse Resp BP MAP O2 Sat     85   125/77   98 %     Physical Examination  Gen:  Alert, oriented, well-nourished, NAD  Pulm:  Normal respiratory effort  Abd:  Gravid, soft, nontender  Obstetrics  FHTs:  120s, moderate variability, +accels, no decels  TOCO:  None  Cervix: 4-5/70/-2  BSUS:  Cephalic  Ext:  Trace edema, no calf tenderness  Neuro:  Grossly intact

## 2024-06-21 NOTE — ANESTHESIA PROCEDURE NOTES
Epidural Block    Patient location during procedure: OB  Start time: 6/21/2024 12:08 PM  End time: 6/21/2024 12:44 PM  Reason for block: labor analgesia  Staffing  Performed: CRNA   Authorized by: YADY Starkey DNP    Performed by: YADY Starkey DNP    Preanesthetic Checklist  Completed: patient identified, IV checked, risks and benefits discussed, surgical consent, pre-op evaluation, timeout performed and sterile techniques followed  Block Timeout  RN/Licensed healthcare professional reads aloud to the Anesthesia provider and entire team: Patient identity, procedure with side and site, patient position, and as applicable the availability of implants/special equipment/special requirements.  Patient on coagulant treatment: no  Timeout performed at: 6/21/2024 12:08 PM  Block Placement  Patient position: sitting  Prep: ChloraPrep  Sterility prep: cap, drape, gloves, hand and mask  Sedation level: no sedation  Patient monitoring: blood pressure and continuous pulse oximetry  Approach: midline  Local numbing: lidocaine 1% to skin and subcutaneous tissues  Vertebral space: lumbar  Lumbar location: L3-L4  Epidural  Loss of resistance technique: saline  Guidance: landmark technique        Needle  Needle type: Tuohy   Needle gauge: 17  Needle insertion depth: 5 cm  Catheter type: end hole  Catheter at skin depth: 10 cm  Catheter securement method: clear occlusive dressing, liquid medical adhesive and surgical tape    Test dose: lidocaine 1.5% with epinephrine 1-to-200,000  Test dose: lidocaine 1.5% with epinephrine 1-to-200,000  Test dose result: no positive test dose    PCEA  Medication concentration used: 0.2% Ropivacaine with 2 mcg/mL Fentanyl  Dose (mL): 5  Lockout (minutes): 30  1-Hour Limit (boluses/hr): 2  Basal Rate: 10        Assessment  Block outcome: patient comfortable  Number of attempts: 1  Events: no positive test dose  Procedure assessment: patient tolerated procedure well with  no immediate complications  Additional Notes  Easy epidural placement, single pass. 1221: 2ml 1.5% lido w 1:200k epi given per epidural. 1238: Left T11 and right T12 assessed. Pt comfortable with contractions. Pt repositioned onto left side.

## 2024-06-21 NOTE — SIGNIFICANT EVENT
FHTs:  Recurrent variable decelerations  TOCO:  q1-3min  Pitocin turned off, IVF, position changes.  Discussed possible amnioinfusion if no resolution with intrauterine resuscitation.

## 2024-06-21 NOTE — SIGNIFICANT EVENT
Comfortable with epidural  FHTs: 115, moderate variability, +variable decels, intermittent lates  TOCO:  q2-3min  Cervix:  7/80/0  Category II tracing with moderate variability and +scalp stimulation, making progress in labor  -Continue to monitor closely

## 2024-06-21 NOTE — L&D DELIVERY NOTE
OB Delivery Note  2024  Job Metz  23 y.o.   Vaginal, Spontaneous        Gestational Age: 39w0d  /Para:   Quantitative Blood Loss: Admission to Discharge: 100 mL (2024  7:53 AM - 2024  7:06 PM)    Robert Metz [[id number]]      Labor Events    Rupture date/time: 2024 1342  Rupture type: Artificial  Fluid color: Clear  Fluid odor: None  Labor type: Induced Onset of Labor  Labor allowed to proceed with plans for an attempted vaginal birth?: Yes  Induction: Oxytocin, AROM  Induction indications: Other  Complications: None       Labor Event Times    Labor onset date/time: 2024 0800  Dilation complete date/time: 2024 174  Start pushing date/time: 2024 175       Placenta    Placenta delivery date/time: 2024 180  Placenta removal: Spontaneous  Placenta appearance: Intact  Placenta disposition: discarded       Cord    Vessels: 3 vessels  Complications: None  Delayed cord clamping?: Yes  Cord blood disposition: Lab  Gases sent?: No  Stem cell collection (by provider): No       Lacerations    Episiotomy: None  Perineal laceration: None  Other lacerations?: No  Repair suture: None       Anesthesia    Method: Epidural       Operative Delivery    Forceps attempted?: No  Vacuum extractor attempted?: No       Shoulder Dystocia    Shoulder dystocia present?: No        Delivery    Time head delivered: 2024 18:05:00  Birth date/time: 2024 18:05:00  Delivery type: Vaginal, Spontaneous  Complications: None       Resuscitation    Method: Tactile stimulation       Apgars    Living status: Living  Apgar Component Scores:  1 min.:  5 min.:  10 min.:  15 min.:  20 min.:    Skin color:  0  1       Heart rate:  2  2       Reflex irritability:  2  2       Muscle tone:  2  2       Respiratory effort:  2  2       Total:  8  9       Apgars assigned by: MIGUEL JANE RN       Delivery Providers    Delivering clinician: Esequiel Duran MD   Provider Role    Lizeth  Siva, RN Delivery Nurse    Laxmi Fermin, RN Nursery Nurse                     Esequiel Duran MD

## 2024-06-21 NOTE — PROGRESS NOTES
Intrapartum Progress Note    Assessment/Plan   Job Metz is a 23 y.o.  at 39w0d. MICHAEL: 2024, by Last Menstrual Period.   -Doing well, continue pitocin per protocol    Principal Problem:    39 weeks gestation of pregnancy ([facility])  Active Problems:    History of vacuum extraction assisted delivery    Anemia affecting pregnancy in third trimester ([facility])    Scoliosis    Pregnancy Problems (from 22 to present)       Problem Noted Resolved    39 weeks gestation of pregnancy ([facility]) 2024 by Chester Francois MD No    Priority:  Medium      Asthma during pregnancy in third trimester ([facility]) 2024 by Lauren Longoria MD No    Priority:  Medium      Overview Signed 2024  6:04 AM by Lauren Longoria MD     History of childhood asthma with intubation  No inhaler use this pregnancy, but has albuterol at home         Anemia affecting pregnancy in third trimester ([facility]) 2024 by Deanna Santoro MD No    Priority:  Medium      Overview Signed 2024  4:48 AM by Lauren Longoria MD     3/12/2024  Second trimester h/h 10.1/29.9. iron BID sent to pharmacy. Plan to repeat labs in 2-3 weeks. SGP            Short interval between pregnancies affecting pregnancy in third trimester, antepartum ([facility]) 2024 by Deanna Santoro MD No    Priority:  Medium      Uterine contractions during pregnancy ([facility]) 6/3/2024 by THERESA Price-CNP 2024 by Lauren Longoria MD    Priority:  Medium       uterine contractions ([facility]) 2024 by Deanna Santoro MD 2024 by Lauren Longoria MD    Priority:  Medium      Pica, in adults 2024 by LEIGH Dawn 2024 by Chester Francois MD    Priority:  Medium      Overview Signed 2024  2:57 PM by LEIGH Dawn     2024  Patient ijndicates that she is eating cardboard very little bits every day. Will retest anemia panel. SGP         Insufficient prenatal care in  second trimester ([facility]) 3/12/2024 by LEIGH Dawn 6/21/2024 by Chester Francois MD    Priority:  Medium      Overview Addendum 3/12/2024  8:51 PM by LEIGH Dawn     3/12/2024  Patient seen for initial prenatal visit at 5w5d, did present for anatomy scan. Next visit was at 24 weeks. Prenatal panel reordered at that visit with one hour glucose. Reviewed importance of keeping appointments. SGP         Anemia of mother in pregnancy, delivered with postpartum condition ([facility]) 3/12/2024 by LEIGH Dawn 6/16/2024 by Lauren Longoria MD    Priority:  Medium      Overview Addendum 3/12/2024  8:53 PM by LEIGH Dawn     3/12/2024  Second trimester h/h 10.1/29.9. iron BID sent to pharmacy. Plan to repeat labs in 2-3 weeks. SGP           Nausea and vomiting in pregnancy prior to 22 weeks gestation ([facility]) 11/2/2023 by Deanna Santoro MD 6/21/2024 by Chester Francois MD    Priority:  Medium      Overview Signed 11/2/2023  7:37 PM by Deanna Santoro MD     B6, Unisom, Zofran                 Subjective   Pt comfortable with epidural.    Objective   Last Vitals:  Temp Pulse Resp BP MAP Pulse Ox   36.2 °C (97.2 °F) 81 18 128/77   100 %     Vitals Min/Max Last 24 Hours:  Temp  Min: 36.2 °C (97.2 °F)  Max: 36.7 °C (98.1 °F)  Pulse  Min: 66  Max: 85  Resp  Min: 18  Max: 18  BP  Min: 114/80  Max: 133/66    Intake/Output:  No intake or output data in the 24 hours ending 06/21/24 1348    Physical Examination:  FHT: 120s, moderate variability, +accels, intermittent variables  TOCO: q4-5  CERVIX: 6/80/-1  AROM clear

## 2024-06-22 PROBLEM — Z64.1 MULTIGRAVIDA: Status: ACTIVE | Noted: 2024-06-22

## 2024-06-22 PROBLEM — Z87.59 HISTORY OF VACUUM EXTRACTION ASSISTED DELIVERY: Status: RESOLVED | Noted: 2023-11-02 | Resolved: 2024-06-22

## 2024-06-22 PROBLEM — Z3A.39 39 WEEKS GESTATION OF PREGNANCY (HHS-HCC): Status: RESOLVED | Noted: 2024-06-21 | Resolved: 2024-06-22

## 2024-06-22 PROBLEM — O13.9 GESTATIONAL HYPERTENSION (HHS-HCC): Status: ACTIVE | Noted: 2024-06-22

## 2024-06-22 LAB
ALBUMIN SERPL BCP-MCNC: 3 G/DL (ref 3.4–5)
ALP SERPL-CCNC: 136 U/L (ref 33–110)
ALT SERPL W P-5'-P-CCNC: 12 U/L (ref 7–45)
ANION GAP SERPL CALC-SCNC: 13 MMOL/L (ref 10–20)
AST SERPL W P-5'-P-CCNC: 15 U/L (ref 9–39)
BILIRUB SERPL-MCNC: 0.2 MG/DL (ref 0–1.2)
BUN SERPL-MCNC: 6 MG/DL (ref 6–23)
CALCIUM SERPL-MCNC: 8.2 MG/DL (ref 8.6–10.3)
CHLORIDE SERPL-SCNC: 107 MMOL/L (ref 98–107)
CO2 SERPL-SCNC: 21 MMOL/L (ref 21–32)
CREAT SERPL-MCNC: 0.61 MG/DL (ref 0.5–1.05)
EGFRCR SERPLBLD CKD-EPI 2021: >90 ML/MIN/1.73M*2
ERYTHROCYTE [DISTWIDTH] IN BLOOD BY AUTOMATED COUNT: 19.3 % (ref 11.5–14.5)
GLUCOSE SERPL-MCNC: 140 MG/DL (ref 74–99)
HCT VFR BLD AUTO: 29.1 % (ref 36–46)
HGB BLD-MCNC: 9.3 G/DL (ref 12–16)
LDH SERPL L TO P-CCNC: 207 U/L (ref 84–246)
MCH RBC QN AUTO: 23.3 PG (ref 26–34)
MCHC RBC AUTO-ENTMCNC: 32 G/DL (ref 32–36)
MCV RBC AUTO: 73 FL (ref 80–100)
NRBC BLD-RTO: 0 /100 WBCS (ref 0–0)
PLATELET # BLD AUTO: 274 X10*3/UL (ref 150–450)
POTASSIUM SERPL-SCNC: 3.5 MMOL/L (ref 3.5–5.3)
PROT SERPL-MCNC: 5 G/DL (ref 6.4–8.2)
RBC # BLD AUTO: 3.99 X10*6/UL (ref 4–5.2)
SODIUM SERPL-SCNC: 137 MMOL/L (ref 136–145)
URATE SERPL-MCNC: 4.2 MG/DL (ref 2.3–6.7)
WBC # BLD AUTO: 12.5 X10*3/UL (ref 4.4–11.3)

## 2024-06-22 PROCEDURE — 83615 LACTATE (LD) (LDH) ENZYME: CPT | Performed by: ADVANCED PRACTICE MIDWIFE

## 2024-06-22 PROCEDURE — 85027 COMPLETE CBC AUTOMATED: CPT | Performed by: ADVANCED PRACTICE MIDWIFE

## 2024-06-22 PROCEDURE — 2500000004 HC RX 250 GENERAL PHARMACY W/ HCPCS (ALT 636 FOR OP/ED): Performed by: ADVANCED PRACTICE MIDWIFE

## 2024-06-22 PROCEDURE — 99232 SBSQ HOSP IP/OBS MODERATE 35: CPT | Performed by: ADVANCED PRACTICE MIDWIFE

## 2024-06-22 PROCEDURE — 84550 ASSAY OF BLOOD/URIC ACID: CPT | Performed by: ADVANCED PRACTICE MIDWIFE

## 2024-06-22 PROCEDURE — 2500000001 HC RX 250 WO HCPCS SELF ADMINISTERED DRUGS (ALT 637 FOR MEDICARE OP): Performed by: OBSTETRICS & GYNECOLOGY

## 2024-06-22 PROCEDURE — 80053 COMPREHEN METABOLIC PANEL: CPT | Performed by: ADVANCED PRACTICE MIDWIFE

## 2024-06-22 PROCEDURE — 2500000005 HC RX 250 GENERAL PHARMACY W/O HCPCS: Performed by: OBSTETRICS & GYNECOLOGY

## 2024-06-22 PROCEDURE — 1120000001 HC OB PRIVATE ROOM DAILY

## 2024-06-22 RX ORDER — KETOROLAC TROMETHAMINE 30 MG/ML
30 INJECTION, SOLUTION INTRAMUSCULAR; INTRAVENOUS ONCE
Status: COMPLETED | OUTPATIENT
Start: 2024-06-22 | End: 2024-06-22

## 2024-06-22 RX ORDER — DOCUSATE SODIUM 100 MG/1
100 CAPSULE, LIQUID FILLED ORAL 2 TIMES DAILY
Status: DISCONTINUED | OUTPATIENT
Start: 2024-06-22 | End: 2024-06-23 | Stop reason: HOSPADM

## 2024-06-22 RX ORDER — OXYCODONE HYDROCHLORIDE 5 MG/1
5 TABLET ORAL EVERY 6 HOURS PRN
Status: DISCONTINUED | OUTPATIENT
Start: 2024-06-22 | End: 2024-06-23 | Stop reason: HOSPADM

## 2024-06-22 RX ORDER — ACETAMINOPHEN 325 MG/1
975 TABLET ORAL EVERY 6 HOURS PRN
Qty: 60 TABLET | Refills: 0 | Status: SHIPPED | OUTPATIENT
Start: 2024-06-22 | End: 2024-07-22

## 2024-06-22 RX ORDER — IBUPROFEN 600 MG/1
600 TABLET ORAL EVERY 6 HOURS
Qty: 56 TABLET | Refills: 0 | Status: SHIPPED | OUTPATIENT
Start: 2024-06-22 | End: 2024-07-06

## 2024-06-22 ASSESSMENT — PAIN SCALES - GENERAL
PAINLEVEL_OUTOF10: 8
PAINLEVEL_OUTOF10: 10 - WORST POSSIBLE PAIN
PAINLEVEL_OUTOF10: 8
PAINLEVEL_OUTOF10: 8
PAINLEVEL_OUTOF10: 3
PAINLEVEL_OUTOF10: 7
PAINLEVEL_OUTOF10: 8

## 2024-06-22 NOTE — PROGRESS NOTES
Postpartum Progress Note    Assessment/Plan   Job Metz is a 23 y.o., , who delivered at 39w0d gestation and is PPD #1. She is having difficulty coping with current postpartum pain and requests a med change.      A:  1) Postpartum day 1  2) Gestational HTN  3) Normal vaginal birth with no complications  4) Anemia affecting pregnancy  5) Breastfeeding difficulties- being seen by lactation consultant regularly  6) Activity ad terri well tolerated    P:  1) Reviewed self care, breastfeeding, postpartum plan of care  2) Ordered pre-e labs and CBC  3) Pain not well-controlled- sent rx for toradol IM  4) Birth control plan: desires Nexplanon implant before discharge  5) RTC 4 wks for PP check with primary OB provider  6) Plan to discharge on  if continues to meet postpartum milestones and has WNL blood pressures and labs    Patient is happy with birth experience and hospital stay.    Principal Problem:    Single liveborn infant delivered vaginally ([facility])  Active Problems:    Anemia affecting pregnancy in third trimester ([facility])    Multigravida    Term birth of  male ([facility])    Gestational hypertension ([facility])    Pregnancy Problems (from 23 to present)       Problem Noted Resolved    Multigravida 2024 by THERESA Granados-TREMAYNE No    Asthma during pregnancy in third trimester ([facility]) 2024 by Lauren Longoria MD No    Overview Signed 2024  6:04 AM by Lauren Longoria MD     History of childhood asthma with intubation  No inhaler use this pregnancy, but has albuterol at home         Anemia affecting pregnancy in third trimester ([facility]) 2024 by Deanna Santoro MD No    Overview Signed 2024  4:48 AM by Lauren Longoria MD     3/12/2024  Second trimester h/h 10.1/29.9. iron BID sent to pharmacy. Plan to repeat labs in 2-3 weeks. SGP            Short interval between pregnancies affecting pregnancy in third trimester, antepartum ([facility]) 2024 by  Deanna Santoro MD No    39 weeks gestation of pregnancy ([facility]) 2024 by Chester Francois MD [facility]    Uterine contractions during pregnancy ([facility]) 6/3/2024 by Soila Barber APRN-CNP 2024 by Lauren Longoria MD     uterine contractions ([facility]) 2024 by Deanna Santoro MD 2024 by Lauren Longoria MD    Pica, in adults 2024 by THERESA Dawn-[facility] 2024 by Chester Francois MD    Overview Signed 2024  2:57 PM by THERESA Dawn-JUAN LUIS     2024  Patient ijndicates that she is eating cardboard very little bits every day. Will retest anemia panel. SGP         Insufficient prenatal care in second trimester ([facility]) 3/12/2024 by THERESA Dawn-JUAN LUIS 2024 by Chester Francois MD    Overview Addendum 3/12/2024  8:51 PM by THERESA DawnJUAN LUIS     3/12/2024  Patient seen for initial prenatal visit at 5w5d, did present for anatomy scan. Next visit was at 24 weeks. Prenatal panel reordered at that visit with one hour glucose. Reviewed importance of keeping appointments. SGP         Anemia of mother in pregnancy, delivered with postpartum condition ([facility]) 3/12/2024 by THERESA Dawn-JUAN LUIS 2024 by Lauren Longoria MD    Overview Addendum 3/12/2024  8:53 PM by MARY ANN Dawn     3/12/2024  Second trimester h/h 10.1/29.9. iron BID sent to pharmacy. Plan to repeat labs in 2-3 weeks. SGP           Nausea and vomiting in pregnancy prior to 22 weeks gestation ([facility]) 2023 by Deanna Santoro MD 2024 by Chester Francois MD    Overview Signed 2023  7:37 PM by Deanna Santoro MD     , [facility] course: gestational hypertension      Subjective   Her pain is poorly controlled with current medications  She is passing flatus  She is ambulating well  She is tolerating a Adult diet Regular  She reports poor infant latch  She  denies emotional concerns today   Her plan for contraception is Nexplanon         Objective   Allergies:   Latex and Other         Last Vitals:  Temp Pulse Resp BP MAP Pulse Ox   36.4 °C (97.5 °F) 70 18 (!) 142/71   97 %     Vitals Min/Max Last 24 Hours:  Temp  Min: 35.6 °C (96.1 °F)  Max: 36.8 °C (98.2 °F)  Pulse  Min: 60  Max: 88  Resp  Min: 18  Max: 18  BP  Min: 110/57  Max: 149/75    Intake/Output:     Intake/Output Summary (Last 24 hours) at 6/22/2024 0935  Last data filed at 6/21/2024 2255  Gross per 24 hour   Intake --   Output 1377 ml   Net -1377 ml       Physical Exam:  Constitutional: alert, oriented  Postpartum:  Fundus firm at U-2, light lochia rubra, perineum healing as expected  Breast:  Filling, nipples intact, breastfeeding difficulties, poor latch  Respiratory: normal respiratory effort  Genitourinary: voiding without difficulty  Musculoskeletal: Full ROM  Extremities: no calf tenderness  Neurological: Oriented x3  Psychological: appropriate affect, family bonding evident  Skin: no rashes or lesions      Lab Data:  Lab Results   Component Value Date    WBC 8.7 06/21/2024    HGB 9.6 (L) 06/21/2024    HCT 30.0 (L) 06/21/2024     06/21/2024   Pre-e labs pending    JARAD Kay, RN

## 2024-06-22 NOTE — ANESTHESIA POSTPROCEDURE EVALUATION
"Patient: Job Metz    Procedure Summary       Date: 06/21/24 Room / Location:     Anesthesia Start: 1208 Anesthesia Stop: 1805    Procedure: Labor Analgesia Diagnosis:     Scheduled Providers:  Responsible Provider: THERESA Starkey-SUNNY, ROBIN    Anesthesia Type: epidural ASA Status: 2            Anesthesia Type: epidural    /80 (BP Location: Left arm)   Pulse 78   Temp 36.8 °C (98.2 °F) (Temporal)   Resp 18   Ht 1.727 m (5' 8\")   Wt 84.6 kg (186 lb 8.2 oz)   LMP 09/22/2023   SpO2 98%   Breastfeeding Yes   BMI 28.36 kg/m²        Anesthesia Post Evaluation    Patient location during evaluation: bedside  Patient participation: complete - patient participated  Level of consciousness: awake and alert  Pain management: adequate  Airway patency: patent  Cardiovascular status: acceptable  Respiratory status: acceptable and room air  Hydration status: acceptable  Postoperative Nausea and Vomiting: none  Comments: Epidural catheter removed by nursing. No redness, swelling, or drainage at puncture site.    Complete resolution of numbness. Patient is able to lift legs, bend at the knees, and ambulate.    Patient denies problems with urination.    Patient denies nausea, headache or severe back pain.         There were no known notable events for this encounter.    "

## 2024-06-22 NOTE — LACTATION NOTE
Lactation Consultant Note  Lactation Consultation  Reason for Consult: Initial assessment  Consultant Name: Valentina MERCADO    Maternal Information  Has mother  before?: Yes  How long did the mother previously breastfeed?: 3 months  Previous Maternal Breastfeeding Challenges: Difficult latch  Infant to breast within first 2 hours of birth?: Yes  Exclusive Pump and Bottle Feed: No    Maternal Assessment  Breast Assessment: Large, Soft, Compressible  Nipple Assessment: Intact, Erect  Areola Assessment: Normal    Infant Assessment  Infant Behavior: Sleepy, Gaggy/spitty  Infant Assessment:  (Baby did not suck on gloved finger)    Feeding Assessment  Nutrition Source: Breastmilk  Feeding Method: Nursing at the breast  Latch Assessment: Reluctant, Too sleepy    LATCH TOOL       Breast Pump       Other OB Lactation Tools       Patient Follow-up  Inpatient Lactation Follow-up Needed : Yes    Other OB Lactation Documentation       Recommendations/Summary  Baby skin to skin with dad and was spitty at the time of my visit. Stool diaper changed. Baby placed skin to skin with mom. Latch was attempted. 3 drops of colostum given to baby. baby was not interested in feeding. baby remained skin to skin with mom. A feed will be attempted again in 2 hours or sooner if baby demonstrates feeding cues. Reviewed milk supply patterns and  feeding patterns in the fist and second 24 HOL. Mom was asked to attempt/feed baby at least every 2-3 hours or on demand with a goal of 8-12 feeds daily. Feeding cues reviewed.Breastfeeding education reviewed and questions answered. Mom aware of lactation support and asked to call out for feed assistance and with questions as needed.

## 2024-06-22 NOTE — LACTATION NOTE
Lactation Consultant Note  Lactation Consultation  Reason for Consult: Follow-up assessment  Consultant Name: Valentina MERCADO    Maternal Information  Has mother  before?: Yes  Previous Maternal Breastfeeding Challenges: Difficult latch  Infant to breast within first 2 hours of birth?: Yes  Exclusive Pump and Bottle Feed: No    Maternal Assessment  Breast Assessment: Large, Soft, Compressible  Nipple Assessment: Intact, Erect  Areola Assessment: Normal    Infant Assessment  Infant Behavior: Awake, Rooting response  Infant Assessment: Good cupping of tongue    Feeding Assessment  Nutrition Source: Breastmilk  Feeding Method: Nursing at the breast  Feeding Position: Cradle, Cross - cradle, Football/seated  Suck/Feeding: Sustained, Tactile stimulation needed  Latch Assessment: Minimal assistance is needed, Latch achieved after repeated attempts, Sucks with long jaw movement, Chin moves in rhythmic motion, Minimal audible swallowing    LATCH TOOL  Latch: Repeated attempts, hold nipple in mouth, stimulate to suck  Audible Swallowing: A few with stimulation  Type of Nipple: Everted (After stimulation)  Comfort (Breast/Nipple): Soft/non-tender  Hold (Positioning): Minimal assist, teach one side, mother does other, staff holds  LATCH Score: 7    Breast Pump       Other OB Lactation Tools       Patient Follow-up  Inpatient Lactation Follow-up Needed : Yes    Other OB Lactation Documentation       Recommendations/Summary  Assisted with latch to both sides. Mom was shown a few different positions for latching. Baby is eager to latch he demonstrating improvement during feed baby detaches a few times but is able to latch back on mom will continue to work on latch for feeds. Mom is able to express drops of colostrum as well. Mom will call of assistance as needed.

## 2024-06-23 VITALS
SYSTOLIC BLOOD PRESSURE: 118 MMHG | OXYGEN SATURATION: 99 % | WEIGHT: 186.51 LBS | DIASTOLIC BLOOD PRESSURE: 70 MMHG | TEMPERATURE: 97.5 F | HEART RATE: 76 BPM | BODY MASS INDEX: 28.27 KG/M2 | HEIGHT: 68 IN | RESPIRATION RATE: 18 BRPM

## 2024-06-23 LAB
BLOOD EXPIRATION DATE: NORMAL
DISPENSE STATUS: NORMAL
PRODUCT BLOOD TYPE: 6200
PRODUCT CODE: NORMAL
UNIT ABO: NORMAL
UNIT NUMBER: NORMAL
UNIT RH: NORMAL
UNIT VOLUME: 350
XM INTEP: NORMAL

## 2024-06-23 PROCEDURE — 2500000001 HC RX 250 WO HCPCS SELF ADMINISTERED DRUGS (ALT 637 FOR MEDICARE OP): Performed by: OBSTETRICS & GYNECOLOGY

## 2024-06-23 PROCEDURE — 0JHF3HZ INSERTION OF CONTRACEPTIVE DEVICE INTO LEFT UPPER ARM SUBCUTANEOUS TISSUE AND FASCIA, PERCUTANEOUS APPROACH: ICD-10-PCS | Performed by: MIDWIFE

## 2024-06-23 PROCEDURE — 2500000005 HC RX 250 GENERAL PHARMACY W/O HCPCS: Performed by: MIDWIFE

## 2024-06-23 PROCEDURE — 2500000004 HC RX 250 GENERAL PHARMACY W/ HCPCS (ALT 636 FOR OP/ED): Performed by: MIDWIFE

## 2024-06-23 PROCEDURE — 11981 INSERTION DRUG DLVR IMPLANT: CPT | Performed by: MIDWIFE

## 2024-06-23 PROCEDURE — 2500000001 HC RX 250 WO HCPCS SELF ADMINISTERED DRUGS (ALT 637 FOR MEDICARE OP): Performed by: MIDWIFE

## 2024-06-23 RX ORDER — FERROUS SULFATE 325(65) MG
65 TABLET ORAL
Status: DISCONTINUED | OUTPATIENT
Start: 2024-06-23 | End: 2024-06-23 | Stop reason: HOSPADM

## 2024-06-23 RX ORDER — LIDOCAINE HYDROCHLORIDE 10 MG/ML
1 INJECTION, SOLUTION EPIDURAL; INFILTRATION; INTRACAUDAL; PERINEURAL ONCE
Status: COMPLETED | OUTPATIENT
Start: 2024-06-23 | End: 2024-06-23

## 2024-06-23 RX ORDER — IBUPROFEN 600 MG/1
600 TABLET ORAL EVERY 6 HOURS
Qty: 56 TABLET | Refills: 0 | Status: SHIPPED | OUTPATIENT
Start: 2024-06-23 | End: 2024-07-07

## 2024-06-23 RX ORDER — ACETAMINOPHEN 325 MG/1
975 TABLET ORAL EVERY 6 HOURS
Qty: 168 TABLET | Refills: 0 | Status: SHIPPED | OUTPATIENT
Start: 2024-06-23 | End: 2024-07-07

## 2024-06-23 ASSESSMENT — PAIN SCALES - GENERAL
PAINLEVEL_OUTOF10: 7
PAINLEVEL_OUTOF10: 7
PAINLEVEL_OUTOF10: 0 - NO PAIN

## 2024-06-23 NOTE — PROCEDURES
Pt desires RIGHT arm Nexplanon contraceptive implant prior to discharge. Prior to procedure, we reviewed R/B, procedure, follow up, SE. She had no questions and verbalized understanding. Written consent completed.     Site selection to RIGHT arm per 's guidelines, site prepped with povidone iodine  Lidocaine 1% administered following negative aspiration to anticipated insertion site  Nexplanon inserted uneventfully using pre-loaded device  Insertion site closed with steristrips, well approximated & hemostatic. procedural QBL <1mL   Sterile gauze, pressure bandage applied   Pt tolerated the procedure without event, no concerns or questions. Wound care, s/s infection, and follow up reviewed   Medication/Lot # card and medication education pamphlet provided to pt     CATHY ABRAHAM

## 2024-06-23 NOTE — CARE PLAN
The patient's goals for the shift include resting    The clinical goals for the shift include discharge home    Problem: Postpartum  Goal: Experiences normal postpartum course  Outcome: Met  Goal: Appropriate maternal -  bonding  Outcome: Met  Goal: Establish and maintain infant feeding pattern for adequate nutrition  Outcome: Met  Goal: Incisions, wounds, or drain sites healing without S/S of infection  Outcome: Met  Goal: No s/sx infection  Outcome: Met  Goal: No s/sx of hemorrhage  Outcome: Met  Goal: Minimal s/sx of HDP and BP<160/110  Outcome: Met     Problem: Pain - Adult  Goal: Verbalizes/displays adequate comfort level or baseline comfort level  Outcome: Met     Problem: Safety - Adult  Goal: Free from fall injury  Outcome: Met     Problem: Discharge Planning  Goal: Discharge to home or other facility with appropriate resources  Outcome: Met

## 2024-06-23 NOTE — LACTATION NOTE
Lactation Consultant Note  Lactation Consultation  Reason for Consult: Follow-up assessment  Consultant Name: simone MERCADO    Maternal Information  Has mother  before?: Yes  Infant to breast within first 2 hours of birth?: Yes  Exclusive Pump and Bottle Feed: No    Maternal Assessment       Infant Assessment  Infant Behavior: Content after feeding    Feeding Assessment  Nutrition Source: Breastmilk    LATCH TOOL       Breast Pump       Other OB Lactation Tools       Patient Follow-up  Inpatient Lactation Follow-up Needed : No  Lactation Professional - OK to Discharge: Yes    Other OB Lactation Documentation       Recommendations/Summary  Mom reports that feeds are going well. Mom is able to latch baby independently. We reviewed some breastfeeding education. Mom has no further questions at this time. Outpatient lactation discussed. Mom will follow up as needed.

## 2024-06-23 NOTE — DISCHARGE SUMMARY
Discharge Summary    Admission Date: 6/21/2024  Discharge Date: 6/23/2024    Discharge Diagnosis  Single liveborn infant delivered vaginally ([facility])    Hospital Course  Delivery Date: 6/21/2024  6:05 PM   Delivery type: Vaginal, Spontaneous    GA at delivery: 39w0d  Outcome: Living   Anesthesia during delivery: Epidural   Intrapartum complications: None   Feeding method: Breastfeeding Status: Yes - independent and doing well     Procedures:  Nexplanon insertion to RIGHT arm   Contraception at discharge: Nexplanon      Pertinent Physical Exam At Time of Discharge    General: Examination reveals a well developed, well nourished, female, in no acute distress. She is alert and cooperative.  HEENT: PERRLA. External ears normal. Nose normal, no erythema or discharge. Mouth and throat clear.  Neck: supple, no significant adenopathy.  Lungs: clear to auscultation bilaterally.  Cardiac: regular rate and rhythm, S1, S2 normal, no murmur, click, rub or gallop.  Breasts: lactating, no erythema or tenderness, nipples normal.  Abdomen: soft, non-distended, non-tender, BS +. Pt s/p BM.  Fundus: firm and 2FW below umbilicus.  Perineum: intact per pt, declines exam .  Extremities: no redness or tenderness in the calves or thighs, no edema.  Neurological: alert, oriented, normal speech, no focal findings or movement disorder noted.  Psychological: awake and alert; oriented to person, place, and time.    Discharge Meds     Your medication list        START taking these medications        Instructions Last Dose Given Next Dose Due   ibuprofen 600 mg tablet      Take 1 tablet (600 mg) by mouth every 6 hours for 14 days.       ibuprofen 600 mg tablet      Take 1 tablet (600 mg) by mouth every 6 hours for 14 days.              CHANGE how you take these medications        Instructions Last Dose Given Next Dose Due   acetaminophen 325 mg tablet  Commonly known as: Tylenol  What changed: Another medication with the same name was added.  Make sure you understand how and when to take each.      Take 3 tablets (975 mg) by mouth every 6 hours if needed for mild pain (1 - 3).       acetaminophen 325 mg tablet  Commonly known as: TylenoL  What changed: You were already taking a medication with the same name, and this prescription was added. Make sure you understand how and when to take each.      Take 3 tablets (975 mg) by mouth every 6 hours if needed for moderate pain (4 - 6).       acetaminophen 325 mg tablet  Commonly known as: Tylenol  What changed: You were already taking a medication with the same name, and this prescription was added. Make sure you understand how and when to take each.      Take 3 tablets (975 mg) by mouth every 6 hours for 14 days.              CONTINUE taking these medications        Instructions Last Dose Given Next Dose Due   ferrous fumarate-vitamin B12-vitamin C-folic acid 110-0.5 mg capsule  Commonly known as: Foltrin      Take 1 capsule by mouth 2 times a day before meals.       prenatal vitamin (iron-folic) 27 mg iron-800 mcg folic acid tablet      Take 1 tablet by mouth once daily.              STOP taking these medications      cyclobenzaprine 10 mg tablet  Commonly known as: Flexeril        doxylamine 25 mg tablet  Commonly known as: Unisom (doxylamine)        ferrous sulfate 8.8 mg/mL elemental iron elixir        metoclopramide 10 mg tablet  Commonly known as: Reglan                  Where to Get Your Medications        These medications were sent to [facility]/pharmacy #[id number] - [place], [place] - [address] AT [facility]  [id number], [facility] [address]      Phone: [phone]   acetaminophen 325 mg tablet  acetaminophen 325 mg tablet  ibuprofen 600 mg tablet  ibuprofen 600 mg tablet          Complications Requiring Follow-Up  Routine PP care     Test Results Pending At Discharge  Pending Labs       No current pending labs.            Outpatient Follow-Up  No future appointments.    I spent 10 minutes in the  professional and overall care of this patient.      THERESA Durant-JUAN LUISM

## 2024-08-29 ENCOUNTER — HOSPITAL ENCOUNTER (EMERGENCY)
Facility: HOSPITAL | Age: 24
Discharge: HOME | End: 2024-08-29
Attending: EMERGENCY MEDICINE
Payer: COMMERCIAL

## 2024-08-29 VITALS
WEIGHT: 146 LBS | OXYGEN SATURATION: 99 % | DIASTOLIC BLOOD PRESSURE: 99 MMHG | TEMPERATURE: 98.2 F | HEART RATE: 91 BPM | SYSTOLIC BLOOD PRESSURE: 156 MMHG | BODY MASS INDEX: 22.13 KG/M2 | RESPIRATION RATE: 18 BRPM | HEIGHT: 68 IN

## 2024-08-29 DIAGNOSIS — T78.2XXA ANAPHYLAXIS, INITIAL ENCOUNTER: Primary | ICD-10-CM

## 2024-08-29 PROCEDURE — 96375 TX/PRO/DX INJ NEW DRUG ADDON: CPT

## 2024-08-29 PROCEDURE — 2500000004 HC RX 250 GENERAL PHARMACY W/ HCPCS (ALT 636 FOR OP/ED): Performed by: EMERGENCY MEDICINE

## 2024-08-29 PROCEDURE — 99284 EMERGENCY DEPT VISIT MOD MDM: CPT | Performed by: EMERGENCY MEDICINE

## 2024-08-29 PROCEDURE — 99284 EMERGENCY DEPT VISIT MOD MDM: CPT | Mod: 25

## 2024-08-29 PROCEDURE — 96372 THER/PROPH/DIAG INJ SC/IM: CPT | Mod: 59 | Performed by: EMERGENCY MEDICINE

## 2024-08-29 PROCEDURE — 96374 THER/PROPH/DIAG INJ IV PUSH: CPT

## 2024-08-29 RX ORDER — EPINEPHRINE 0.3 MG/.3ML
1 INJECTION SUBCUTANEOUS ONCE AS NEEDED
Qty: 2 EACH | Refills: 1 | Status: SHIPPED | OUTPATIENT
Start: 2024-08-29

## 2024-08-29 RX ORDER — FAMOTIDINE 10 MG/ML
20 INJECTION INTRAVENOUS ONCE
Status: COMPLETED | OUTPATIENT
Start: 2024-08-29 | End: 2024-08-29

## 2024-08-29 RX ORDER — DIPHENHYDRAMINE HYDROCHLORIDE 50 MG/ML
50 INJECTION INTRAMUSCULAR; INTRAVENOUS ONCE
Status: COMPLETED | OUTPATIENT
Start: 2024-08-29 | End: 2024-08-29

## 2024-08-29 RX ORDER — EPINEPHRINE 1 MG/ML
0.3 INJECTION, SOLUTION, CONCENTRATE INTRAVENOUS ONCE
Status: COMPLETED | OUTPATIENT
Start: 2024-08-29 | End: 2024-08-29

## 2024-08-29 RX ORDER — HYDROXYZINE HYDROCHLORIDE 25 MG/1
25 TABLET, FILM COATED ORAL EVERY 6 HOURS PRN
Qty: 12 TABLET | Refills: 0 | Status: SHIPPED | OUTPATIENT
Start: 2024-08-29 | End: 2024-09-01

## 2024-08-29 RX ADMIN — METHYLPREDNISOLONE SODIUM SUCCINATE 125 MG: 125 INJECTION INTRAMUSCULAR; INTRAVENOUS at 02:17

## 2024-08-29 RX ADMIN — EPINEPHRINE 0.3 MG: 1 INJECTION, SOLUTION, CONCENTRATE INTRAVENOUS at 02:17

## 2024-08-29 RX ADMIN — DIPHENHYDRAMINE HYDROCHLORIDE 50 MG: 50 INJECTION INTRAMUSCULAR; INTRAVENOUS at 02:18

## 2024-08-29 RX ADMIN — FAMOTIDINE 20 MG: 10 INJECTION INTRAVENOUS at 02:18

## 2024-08-29 ASSESSMENT — PAIN - FUNCTIONAL ASSESSMENT: PAIN_FUNCTIONAL_ASSESSMENT: 0-10

## 2024-08-29 ASSESSMENT — PAIN SCALES - GENERAL
PAINLEVEL_OUTOF10: 0 - NO PAIN
PAINLEVEL_OUTOF10: 0 - NO PAIN

## 2024-08-29 NOTE — ED TRIAGE NOTES
Pt presented with itching, hives, hand edema, and lip and tongue edema pt states that she was sleep and she woke up with it, pt states that she did eat shrimp before going to sleep however she has never had a shellfish allergy and just ate shrimp 3 days ago and was fine, no signs of respiratory distress

## 2024-08-29 NOTE — ED PROVIDER NOTES
Job Metz is a 23 y.o. patient presenting to the ED for allergic reaction.  The patient states she went to bed feeling normal.  She woke up just prior to arrival covered in hives and itching.  She feels that her tongue and her lips are swollen.  She denies any shortness of breath, wheezing, nausea, vomiting, diarrhea.  She had 1 similar prior episode which was attributed to a new soap, but she denies any recent changes to soap, shampoo, lotion, laundry detergent, fabric softener.  No new foods.  No other new exposures.    Additional History Obtained from: None  Limitations to History: None  ------------------------------------------------------------------------------------------------------------------------------------------  Physical Exam:  Appearance: Alert, cooperative,   Skin: Warm, dry.  Diffuse urticaria.  Lips and tongue are mildly edematous.  Eyes: Cornea clear. No scleral icterus or injection.   ENT: Mucous membranes moist.  Pulmonary: No accessory muscle use or stridor. Clear lung sounds bilaterally without rhonchi or wheezing.   Cardiac: Heart sounds regular without murmur. B/L radial pulses full and symmetric.   Abdomen: Soft, not tender.  No rebound or guarding.   Musculoskeletal: No gross deformities.   Neurological: Face symmetrical. Voice clear. Appropriately conversant.   Psychiatric: Appropriate mood and affect.    Medical Decision Making:  Chronic Medical Conditions Significantly Affecting Care:  has a past medical history of Bartholin's cyst (10/31/2023), Goiter (10/31/2023), Postinflammatory hyperpigmentation (09/16/2016), and Trichomoniasis (06/21/2024).    Social Determinants of Health Significantly Affecting Care: none identified    Differential Diagnosis Considered but not limited to: clinically patient has anaphylaxis with tongue/lip swelling and hives. Cause is unclear.      External Records Reviewed:   I reviewed recent and relevant outside records including:     Independent  Interpretation of Studies: The following studies were ordered as part of the emergency department work up and independently interpreted by me. See ED Course for details.    Treated with IM epi, benedryl, famotidine, pepcid and solumedrol. On reevaluation a short time later she is improving. She was monitored for a couple hours and had near complete resolution of symptoms. Use of epi pen, benedryl if any residual itching were discussed. Return precautions and follow up with PCP also discussed.     Diagnoses as of 09/04/24 1146   Anaphylaxis, initial encounter              Kendal Boucher DO  09/04/24 1149

## 2024-10-14 ENCOUNTER — APPOINTMENT (OUTPATIENT)
Dept: OBSTETRICS AND GYNECOLOGY | Facility: CLINIC | Age: 24
End: 2024-10-14
Payer: COMMERCIAL

## 2024-10-15 ENCOUNTER — OFFICE VISIT (OUTPATIENT)
Dept: OBSTETRICS AND GYNECOLOGY | Facility: CLINIC | Age: 24
End: 2024-10-15
Payer: COMMERCIAL

## 2024-10-15 VITALS
HEART RATE: 82 BPM | WEIGHT: 162.1 LBS | SYSTOLIC BLOOD PRESSURE: 128 MMHG | BODY MASS INDEX: 25.44 KG/M2 | DIASTOLIC BLOOD PRESSURE: 91 MMHG | HEIGHT: 67 IN

## 2024-10-15 DIAGNOSIS — Z11.3 ROUTINE SCREENING FOR STI (SEXUALLY TRANSMITTED INFECTION): ICD-10-CM

## 2024-10-15 DIAGNOSIS — N89.8 VAGINAL DISCHARGE: Primary | ICD-10-CM

## 2024-10-15 DIAGNOSIS — R87.620 ATYPICAL SQUAMOUS CELL CHANGES OF UNDETERMINED SIGNIFICANCE (ASCUS) ON VAGINAL CYTOLOGY: ICD-10-CM

## 2024-10-15 DIAGNOSIS — Z30.09 ENCOUNTER FOR OTHER GENERAL COUNSELING OR ADVICE ON CONTRACEPTION: ICD-10-CM

## 2024-10-15 PROBLEM — Z87.59 HISTORY OF GESTATIONAL HYPERTENSION: Status: RESOLVED | Noted: 2023-11-02 | Resolved: 2024-10-15

## 2024-10-15 PROBLEM — Z30.9 CONTRACEPTIVE MANAGEMENT: Status: ACTIVE | Noted: 2024-10-15

## 2024-10-15 PROCEDURE — 3080F DIAST BP >= 90 MM HG: CPT

## 2024-10-15 PROCEDURE — 3074F SYST BP LT 130 MM HG: CPT

## 2024-10-15 PROCEDURE — 3008F BODY MASS INDEX DOCD: CPT

## 2024-10-15 PROCEDURE — 87205 SMEAR GRAM STAIN: CPT

## 2024-10-15 PROCEDURE — 99213 OFFICE O/P EST LOW 20 MIN: CPT

## 2024-10-15 PROCEDURE — 87661 TRICHOMONAS VAGINALIS AMPLIF: CPT

## 2024-10-15 PROCEDURE — 87491 CHLMYD TRACH DNA AMP PROBE: CPT

## 2024-10-15 PROCEDURE — 99213 OFFICE O/P EST LOW 20 MIN: CPT | Mod: GC

## 2024-10-15 RX ORDER — ETONOGESTREL 68 MG/1
1 IMPLANT SUBCUTANEOUS ONCE
COMMUNITY

## 2024-10-15 ASSESSMENT — ENCOUNTER SYMPTOMS
ALLERGIC/IMMUNOLOGIC NEGATIVE: 0
NEUROLOGICAL NEGATIVE: 0
EYES NEGATIVE: 0
PSYCHIATRIC NEGATIVE: 0
CARDIOVASCULAR NEGATIVE: 0
RESPIRATORY NEGATIVE: 0
MUSCULOSKELETAL NEGATIVE: 0
HEMATOLOGIC/LYMPHATIC NEGATIVE: 0
CONSTITUTIONAL NEGATIVE: 0
GASTROINTESTINAL NEGATIVE: 0
ENDOCRINE NEGATIVE: 0

## 2024-10-15 ASSESSMENT — EDINBURGH POSTNATAL DEPRESSION SCALE (EPDS)
THE THOUGHT OF HARMING MYSELF HAS OCCURRED TO ME: NEVER
I HAVE FELT SAD OR MISERABLE: NO, NOT AT ALL
I HAVE BEEN SO UNHAPPY THAT I HAVE BEEN CRYING: NO, NEVER
I HAVE FELT SCARED OR PANICKY FOR NO GOOD REASON: NO, NOT AT ALL
TOTAL SCORE: 0
I HAVE LOOKED FORWARD WITH ENJOYMENT TO THINGS: AS MUCH AS I EVER DID
I HAVE BEEN ABLE TO LAUGH AND SEE THE FUNNY SIDE OF THINGS: AS MUCH AS I ALWAYS COULD
I HAVE BEEN SO UNHAPPY THAT I HAVE HAD DIFFICULTY SLEEPING: NOT AT ALL
THINGS HAVE BEEN GETTING ON TOP OF ME: NO, I HAVE BEEN COPING AS WELL AS EVER
I HAVE BEEN ANXIOUS OR WORRIED FOR NO GOOD REASON: NO, NOT AT ALL
I HAVE BLAMED MYSELF UNNECESSARILY WHEN THINGS WENT WRONG: NO, NEVER

## 2024-10-15 ASSESSMENT — PATIENT HEALTH QUESTIONNAIRE - PHQ9
2. FEELING DOWN, DEPRESSED OR HOPELESS: NOT AT ALL
1. LITTLE INTEREST OR PLEASURE IN DOING THINGS: NOT AT ALL
SUM OF ALL RESPONSES TO PHQ9 QUESTIONS 1 AND 2: 0

## 2024-10-15 ASSESSMENT — PAIN SCALES - GENERAL: PAINLEVEL: 0-NO PAIN

## 2024-10-15 NOTE — ASSESSMENT & PLAN NOTE
-Would like to continue with nexplanon, declines OCP at this time to help with irregular bleeding.   -Counseled she can always return for further contraception planning.

## 2024-10-15 NOTE — PROGRESS NOTES
"GYN VISIT    SUBJECTIVE    HPI: Job Metz is a 24 y.o.  presenting for vaginal discharge. Discharge started after lochia resolved. First noticed after initial intercourse postpartum around 7 weeks. After intercourse vaginal discharge worsens, increasing in amount. Discharge is malodorous. She feels is different than her BV episodes. Yellow and white discharge. No itching or burning. Has hx of recurrent BV. No pain with intercourse. Last intercourse Friday. No STI exposure that she is aware of. No condom use. Hx of trich     Irregular bleeding. 5 days of dark blood, 4 maxi pads per day. Reports she had a normal period for her beginning of this month. Small amount of bright red blood when wiping yesterday, then resolved.         Problem List Items Addressed This Visit       Contraceptive management    Routine screening for STI (sexually transmitted infection)    Vaginal discharge - Primary        Past medical history, surgical history, medications, allergies, family history, social history updated.    OBJECTIVE  Visit Vitals  BP (!) 128/91   Pulse 82   Ht 1.702 m (5' 7\")   Wt 73.5 kg (162 lb 1.6 oz)   LMP 10/01/2024 (Approximate)   Breastfeeding No   BMI 25.39 kg/m²   OB Status Implant   Smoking Status Never   BSA 1.86 m²        Physical exam:  Gen: No acute distress  Abd: soft, nontender, nondistended  : normal appearing external genitalia. Normal appearing vagina with scant white discharge. Unable to complete speculum exam due to pain from Cervix normal. Bimanual examination nontender, small, mobile uterus, ovaries nonpalpable.  Neuro: awake and conversant  Psych: appropriate mood and affect      ASSESSMENT & PLAN    Job Metz is a 24 y.o.  presenting for increased vaginal discharge. The following concerns we addressed today:    Assessment & Plan  Vaginal discharge  -Discharge likely normal physiological discharge  -Discussed range of normal discharge.   Orders:    Vaginitis Gram Stain " For Bacterial Vaginosis + Yeast    Encounter for other general counseling or advice on contraception  -Would like to continue with nexplanon, declines OCP at this time to help with irregular bleeding.   -Counseled she can always return for further contraception planning.        Routine screening for STI (sexually transmitted infection)  -Recent unprotected vaginal intercourse.  -Declined blood work.          RTC  for annual exam or sooner if needed.     Patient seen and evaluated with Dr. Orion Estrada MD  PGY1, Obstetrics and Gynecology

## 2024-10-15 NOTE — ASSESSMENT & PLAN NOTE
-Discharge likely normal physiological discharge  -Discussed range of normal discharge.   Orders:    Vaginitis Gram Stain For Bacterial Vaginosis + Yeast

## 2024-10-16 LAB
CLUE CELLS VAG LPF-#/AREA: ABNORMAL /[LPF]
NUGENT SCORE: 7
YEAST VAG WET PREP-#/AREA: ABNORMAL

## 2024-10-17 LAB
C TRACH RRNA SPEC QL NAA+PROBE: NEGATIVE
N GONORRHOEA DNA SPEC QL PROBE+SIG AMP: NEGATIVE
T VAGINALIS RRNA SPEC QL NAA+PROBE: NEGATIVE

## 2024-11-01 LAB
CYTOLOGY CMNT CVX/VAG CYTO-IMP: NORMAL
LAB AP HPV HR: NORMAL
LAB AP PAP ADDITIONAL TESTS: NORMAL
LABORATORY COMMENT REPORT: NORMAL
LABORATORY COMMENT REPORT: NORMAL
LMP START DATE: NORMAL
PATH REPORT.TOTAL CANCER: NORMAL

## 2025-01-14 ENCOUNTER — APPOINTMENT (OUTPATIENT)
Dept: OBSTETRICS AND GYNECOLOGY | Facility: CLINIC | Age: 25
End: 2025-01-14
Payer: COMMERCIAL

## 2025-02-11 ENCOUNTER — APPOINTMENT (OUTPATIENT)
Dept: OBSTETRICS AND GYNECOLOGY | Facility: CLINIC | Age: 25
End: 2025-02-11
Payer: COMMERCIAL

## 2025-02-11 VITALS
HEIGHT: 68 IN | WEIGHT: 165 LBS | BODY MASS INDEX: 25.01 KG/M2 | DIASTOLIC BLOOD PRESSURE: 84 MMHG | SYSTOLIC BLOOD PRESSURE: 130 MMHG

## 2025-02-11 DIAGNOSIS — N76.1 CHRONIC VAGINITIS: Primary | ICD-10-CM

## 2025-02-11 PROCEDURE — 3075F SYST BP GE 130 - 139MM HG: CPT | Performed by: ADVANCED PRACTICE MIDWIFE

## 2025-02-11 PROCEDURE — 99213 OFFICE O/P EST LOW 20 MIN: CPT | Performed by: ADVANCED PRACTICE MIDWIFE

## 2025-02-11 PROCEDURE — 3079F DIAST BP 80-89 MM HG: CPT | Performed by: ADVANCED PRACTICE MIDWIFE

## 2025-02-11 PROCEDURE — 1036F TOBACCO NON-USER: CPT | Performed by: ADVANCED PRACTICE MIDWIFE

## 2025-02-11 PROCEDURE — 3008F BODY MASS INDEX DOCD: CPT | Performed by: ADVANCED PRACTICE MIDWIFE

## 2025-02-11 NOTE — PROGRESS NOTES
Assessment/Plan   Problem List Items Addressed This Visit    None  Visit Diagnoses         Codes    Chronic vaginitis    -  Primary N76.1    Relevant Orders    C. trachomatis / N. gonorrhoeae, Amplified, Urogenital    Trichomonas vaginalis, Amplified    Ureaplasma/Mycoplasma Culture    Vaginitis Gram Stain For Bacterial Vaginosis + Yeast            Louise Dominguez, THERESA-JUAN LUISM    Subjective   Job Metz is a 24 y.o. female who complains of vaginal discomfort.    HPI:  Symptoms reported: discharge, odor, and yellow discharge - worse after intercourse  Onset: 2 weeks ago  Course: persistent  Progression: worsened    Helpful treatments: none  Unhelpful treatments tried: none    Sexual history reviewed with the patient.   STI exposures include none.   Patient reports previous history of the following STIs: none.      Denies dysuria. Her last period was beginning of December. She is on nexplanon. She had been having them regularly, and then it stopped being regular.   She is also having abdominal pain - lower. Intermittent.     Objective   Physical Exam:   OBGyn Exam  - grey dicharge on exam, pos whiff    Gc/ct trich and m gen sent to day  Will call if postiive    Answers submitted by the patient for this visit:  Female Genital Questionnaire (Submitted on 2/11/2025)  Chief Complaint: Female genitourinary complaint  genital odor: Yes  missed menses: Yes  pelvic pain: Yes  vaginal discharge: Yes  Chronicity: recurrent  Onset: 1 to 4 weeks ago  Frequency: daily  Progression since onset: unchanged  Pain severity: no pain  Affected side: both  Pregnant now?: No  discolored urine: Yes  Aggravated by: nothing  Sexual activity: sexually active  Partner with STD symptoms: possible  Birth control: oral contraceptives  Menstrual history: irregular  Discharge characteristics: malodorous, mucopurulent, yellow  Passing clots?: No  Passing tissue?: No

## 2025-02-12 ENCOUNTER — TELEPHONE (OUTPATIENT)
Dept: OBSTETRICS AND GYNECOLOGY | Facility: CLINIC | Age: 25
End: 2025-02-12
Payer: COMMERCIAL

## 2025-02-12 LAB
BV SCORE VAG QL: ABNORMAL
C TRACH RRNA SPEC QL NAA+PROBE: NOT DETECTED
M HOMINIS SPEC QL CULT: NORMAL
N GONORRHOEA RRNA SPEC QL NAA+PROBE: NOT DETECTED
QUEST GC CT AMPLIFIED (ALWAYS MESSAGE): NORMAL
SPECIMEN SOURCE: NORMAL
T VAGINALIS RRNA SPEC QL NAA+PROBE: NOT DETECTED
UREAPLASMA SPEC CULT: NORMAL

## 2025-02-12 RX ORDER — METRONIDAZOLE 500 MG/1
500 TABLET ORAL 2 TIMES DAILY
Qty: 14 TABLET | Refills: 0 | Status: SHIPPED | OUTPATIENT
Start: 2025-02-12 | End: 2025-02-19

## 2025-02-12 NOTE — TELEPHONE ENCOUNTER
RN tried to reach Lea at [email or web address]  No answer at this time  Voicemail left encouraging her to call the office back.     Shantel TEEN, RN

## 2025-05-28 ENCOUNTER — OFFICE VISIT (OUTPATIENT)
Dept: OBSTETRICS AND GYNECOLOGY | Facility: CLINIC | Age: 25
End: 2025-05-28
Payer: COMMERCIAL

## 2025-05-28 VITALS
SYSTOLIC BLOOD PRESSURE: 123 MMHG | DIASTOLIC BLOOD PRESSURE: 76 MMHG | BODY MASS INDEX: 25.39 KG/M2 | WEIGHT: 167 LBS | HEART RATE: 83 BPM

## 2025-05-28 DIAGNOSIS — N76.1 CHRONIC VAGINITIS: ICD-10-CM

## 2025-05-28 DIAGNOSIS — N76.0 RECURRENT VAGINITIS: Primary | ICD-10-CM

## 2025-05-28 PROCEDURE — 99214 OFFICE O/P EST MOD 30 MIN: CPT | Mod: TH | Performed by: OBSTETRICS & GYNECOLOGY

## 2025-05-28 PROCEDURE — 3078F DIAST BP <80 MM HG: CPT | Performed by: OBSTETRICS & GYNECOLOGY

## 2025-05-28 PROCEDURE — 3074F SYST BP LT 130 MM HG: CPT | Performed by: OBSTETRICS & GYNECOLOGY

## 2025-05-28 PROCEDURE — 99214 OFFICE O/P EST MOD 30 MIN: CPT | Performed by: OBSTETRICS & GYNECOLOGY

## 2025-05-28 PROCEDURE — 1036F TOBACCO NON-USER: CPT | Performed by: OBSTETRICS & GYNECOLOGY

## 2025-05-28 RX ORDER — METRONIDAZOLE 500 MG/1
500 TABLET ORAL 2 TIMES DAILY
Qty: 14 TABLET | Refills: 0 | Status: SHIPPED | OUTPATIENT
Start: 2025-05-28 | End: 2025-06-04

## 2025-05-28 RX ORDER — METRONIDAZOLE 7.5 MG/G
GEL VAGINAL 2 TIMES WEEKLY
Qty: 70 G | Refills: 11 | Status: SHIPPED | OUTPATIENT
Start: 2025-06-25 | End: 2025-10-14

## 2025-05-28 ASSESSMENT — ENCOUNTER SYMPTOMS
CARDIOVASCULAR NEGATIVE: 0
NEUROLOGICAL NEGATIVE: 0
EYES NEGATIVE: 0
PSYCHIATRIC NEGATIVE: 0
HEMATOLOGIC/LYMPHATIC NEGATIVE: 0
CONSTITUTIONAL NEGATIVE: 0
ENDOCRINE NEGATIVE: 0
RESPIRATORY NEGATIVE: 0
MUSCULOSKELETAL NEGATIVE: 0
GASTROINTESTINAL NEGATIVE: 0
ALLERGIC/IMMUNOLOGIC NEGATIVE: 0

## 2025-05-28 ASSESSMENT — PAIN SCALES - GENERAL: PAINLEVEL_OUTOF10: 0-NO PAIN

## 2025-05-28 NOTE — PROGRESS NOTES
Assessment   Recurrent BV.  - Flagyl 500mg BID X 7 days for treatment  - Start suppressive treatment following treatment course: OTC boric acid vaginally 600mg nightly x 21 days, then Metrogel twice weekly x 4 months  - Partner treated as well ([facility] )    FU as indicated    Deanna Santoro MD    Subjective   25 YO  here for STD check and vaginitis check  Reports she has recurrent BV.  She responds to Flagyl but gets it back after IC.    GynHx:  Menses: amenorrheic on Nexplanon  Contraception: Nexplanon  Sexual activity:  STIs: neg testing 2025  Pap: Neg cytology Oct 2024     PMH, PSH, SoHx, FamHx reviewed and edited as indicated.    Objective   /76   Pulse 83   Wt 75.8 kg (167 lb)   LMP  (LMP Unknown)   BMI 25.39 kg/m²      General:   Alert and oriented, in no acute distress   Skin: No significant acne. No hirsutism.   Neck: Supple. No visible thyromegaly.    Abdomen: Soft, non-tender, without masses or organomegaly   Vulva: Normal architecture without erythema, masses, or lesions.    Vagina: Normal mucosa without lesions, masses, or atrophy. Thin greenish watery discharge.   Psych Normal affect. Normal mood.

## 2025-05-29 LAB — BV SCORE VAG QL: ABNORMAL

## 2025-08-02 ENCOUNTER — APPOINTMENT (OUTPATIENT)
Dept: RADIOLOGY | Facility: HOSPITAL | Age: 25
End: 2025-08-02
Payer: COMMERCIAL

## 2025-08-02 ENCOUNTER — HOSPITAL ENCOUNTER (EMERGENCY)
Facility: HOSPITAL | Age: 25
Discharge: HOME | End: 2025-08-03
Attending: INTERNAL MEDICINE
Payer: COMMERCIAL

## 2025-08-02 DIAGNOSIS — G43.809 OTHER MIGRAINE WITHOUT STATUS MIGRAINOSUS, NOT INTRACTABLE: Primary | ICD-10-CM

## 2025-08-02 DIAGNOSIS — N76.0 BACTERIAL VAGINOSIS: ICD-10-CM

## 2025-08-02 DIAGNOSIS — B96.89 BACTERIAL VAGINOSIS: ICD-10-CM

## 2025-08-02 DIAGNOSIS — N39.0 URINARY TRACT INFECTION WITHOUT HEMATURIA, SITE UNSPECIFIED: ICD-10-CM

## 2025-08-02 PROCEDURE — 99284 EMERGENCY DEPT VISIT MOD MDM: CPT | Mod: 25 | Performed by: INTERNAL MEDICINE

## 2025-08-02 PROCEDURE — 70450 CT HEAD/BRAIN W/O DYE: CPT

## 2025-08-02 PROCEDURE — 70450 CT HEAD/BRAIN W/O DYE: CPT | Performed by: STUDENT IN AN ORGANIZED HEALTH CARE EDUCATION/TRAINING PROGRAM

## 2025-08-02 PROCEDURE — 96375 TX/PRO/DX INJ NEW DRUG ADDON: CPT

## 2025-08-02 PROCEDURE — 2500000004 HC RX 250 GENERAL PHARMACY W/ HCPCS (ALT 636 FOR OP/ED): Performed by: INTERNAL MEDICINE

## 2025-08-02 RX ORDER — DIPHENHYDRAMINE HYDROCHLORIDE 50 MG/ML
25 INJECTION, SOLUTION INTRAMUSCULAR; INTRAVENOUS ONCE
Status: COMPLETED | OUTPATIENT
Start: 2025-08-02 | End: 2025-08-02

## 2025-08-02 RX ORDER — PROCHLORPERAZINE EDISYLATE 5 MG/ML
10 INJECTION INTRAMUSCULAR; INTRAVENOUS ONCE
Status: COMPLETED | OUTPATIENT
Start: 2025-08-02 | End: 2025-08-02

## 2025-08-02 RX ORDER — KETOROLAC TROMETHAMINE 30 MG/ML
30 INJECTION, SOLUTION INTRAMUSCULAR; INTRAVENOUS ONCE
Status: COMPLETED | OUTPATIENT
Start: 2025-08-02 | End: 2025-08-02

## 2025-08-02 RX ADMIN — DIPHENHYDRAMINE HYDROCHLORIDE 25 MG: 50 INJECTION, SOLUTION INTRAMUSCULAR; INTRAVENOUS at 23:16

## 2025-08-02 RX ADMIN — PROCHLORPERAZINE EDISYLATE 10 MG: 5 INJECTION INTRAMUSCULAR; INTRAVENOUS at 23:15

## 2025-08-02 RX ADMIN — KETOROLAC TROMETHAMINE 30 MG: 30 INJECTION, SOLUTION INTRAMUSCULAR at 23:13

## 2025-08-02 ASSESSMENT — PAIN SCALES - GENERAL
PAINLEVEL_OUTOF10: 9
PAINLEVEL_OUTOF10: 10 - WORST POSSIBLE PAIN
PAINLEVEL_OUTOF10: 10 - WORST POSSIBLE PAIN

## 2025-08-02 ASSESSMENT — PAIN DESCRIPTION - LOCATION
LOCATION: HEAD
LOCATION: HEAD

## 2025-08-02 ASSESSMENT — PAIN DESCRIPTION - PAIN TYPE: TYPE: ACUTE PAIN

## 2025-08-02 ASSESSMENT — PAIN - FUNCTIONAL ASSESSMENT
PAIN_FUNCTIONAL_ASSESSMENT: 0-10
PAIN_FUNCTIONAL_ASSESSMENT: 0-10

## 2025-08-02 ASSESSMENT — PAIN DESCRIPTION - PROGRESSION: CLINICAL_PROGRESSION: NOT CHANGED

## 2025-08-03 VITALS
RESPIRATION RATE: 16 BRPM | HEIGHT: 68 IN | WEIGHT: 160 LBS | BODY MASS INDEX: 24.25 KG/M2 | TEMPERATURE: 97.8 F | SYSTOLIC BLOOD PRESSURE: 138 MMHG | OXYGEN SATURATION: 99 % | HEART RATE: 70 BPM | DIASTOLIC BLOOD PRESSURE: 88 MMHG

## 2025-08-03 LAB
APPEARANCE UR: ABNORMAL
BACTERIA #/AREA URNS AUTO: ABNORMAL /HPF
BILIRUB UR STRIP.AUTO-MCNC: NEGATIVE MG/DL
CLUE CELLS SPEC QL WET PREP: PRESENT
COLOR UR: ABNORMAL
GLUCOSE UR STRIP.AUTO-MCNC: NORMAL MG/DL
HCG UR QL IA.RAPID: NEGATIVE
KETONES UR STRIP.AUTO-MCNC: ABNORMAL MG/DL
LEUKOCYTE ESTERASE UR QL STRIP.AUTO: ABNORMAL
NITRITE UR QL STRIP.AUTO: NEGATIVE
PH UR STRIP.AUTO: 5.5 [PH]
PROT UR STRIP.AUTO-MCNC: NEGATIVE MG/DL
RBC # UR STRIP.AUTO: NEGATIVE MG/DL
RBC #/AREA URNS AUTO: ABNORMAL /HPF
SP GR UR STRIP.AUTO: 1.02
SQUAMOUS #/AREA URNS AUTO: ABNORMAL /HPF
T VAGINALIS SPEC QL WET PREP: ABNORMAL
UROBILINOGEN UR STRIP.AUTO-MCNC: NORMAL MG/DL
WBC #/AREA URNS AUTO: >50 /HPF
WBC CLUMPS #/AREA URNS AUTO: ABNORMAL /HPF
WBC VAG QL WET PREP: ABNORMAL
YEAST VAG QL WET PREP: ABNORMAL

## 2025-08-03 PROCEDURE — 87077 CULTURE AEROBIC IDENTIFY: CPT | Mod: AHULAB | Performed by: INTERNAL MEDICINE

## 2025-08-03 PROCEDURE — 81001 URINALYSIS AUTO W/SCOPE: CPT | Performed by: INTERNAL MEDICINE

## 2025-08-03 PROCEDURE — 87210 SMEAR WET MOUNT SALINE/INK: CPT | Performed by: INTERNAL MEDICINE

## 2025-08-03 PROCEDURE — 96365 THER/PROPH/DIAG IV INF INIT: CPT

## 2025-08-03 PROCEDURE — 87491 CHLMYD TRACH DNA AMP PROBE: CPT | Mod: AHULAB | Performed by: INTERNAL MEDICINE

## 2025-08-03 PROCEDURE — 96375 TX/PRO/DX INJ NEW DRUG ADDON: CPT

## 2025-08-03 PROCEDURE — 2500000004 HC RX 250 GENERAL PHARMACY W/ HCPCS (ALT 636 FOR OP/ED): Performed by: INTERNAL MEDICINE

## 2025-08-03 PROCEDURE — 81025 URINE PREGNANCY TEST: CPT | Performed by: INTERNAL MEDICINE

## 2025-08-03 RX ORDER — METRONIDAZOLE 500 MG/1
500 TABLET ORAL 2 TIMES DAILY
Qty: 14 TABLET | Refills: 0 | Status: SHIPPED | OUTPATIENT
Start: 2025-08-03 | End: 2025-08-10

## 2025-08-03 RX ORDER — DROPERIDOL 2.5 MG/ML
1.25 INJECTION, SOLUTION INTRAMUSCULAR; INTRAVENOUS ONCE
Status: COMPLETED | OUTPATIENT
Start: 2025-08-03 | End: 2025-08-03

## 2025-08-03 RX ORDER — CEFTRIAXONE 1 G/50ML
1 INJECTION, SOLUTION INTRAVENOUS ONCE
Status: COMPLETED | OUTPATIENT
Start: 2025-08-03 | End: 2025-08-03

## 2025-08-03 RX ORDER — CEPHALEXIN 500 MG/1
500 CAPSULE ORAL 2 TIMES DAILY
Qty: 10 CAPSULE | Refills: 0 | Status: SHIPPED | OUTPATIENT
Start: 2025-08-03 | End: 2025-08-08

## 2025-08-03 RX ADMIN — CEFTRIAXONE 1 G: 1 INJECTION, SOLUTION INTRAVENOUS at 01:43

## 2025-08-03 RX ADMIN — DROPERIDOL 1.25 MG: 2.5 INJECTION, SOLUTION INTRAMUSCULAR; INTRAVENOUS at 00:23

## 2025-08-03 ASSESSMENT — PAIN SCALES - GENERAL: PAINLEVEL_OUTOF10: 2

## 2025-08-03 ASSESSMENT — PAIN DESCRIPTION - LOCATION: LOCATION: HEAD

## 2025-08-03 ASSESSMENT — PAIN - FUNCTIONAL ASSESSMENT: PAIN_FUNCTIONAL_ASSESSMENT: 0-10

## 2025-08-03 NOTE — ED TRIAGE NOTES
Pt BIBA from home c/o a headache that had started about an hour ago. Pt states that she is dizzy and having spotty vision with this headache. Pt has hx of migraine in the past which she was seen here for. Pt nauseous and vomiting.

## 2025-08-03 NOTE — ED PROVIDER NOTES
"HPI     CC: Headache     HPI: Job Metz is a 24 y.o. female with a history of MS given recurrent BV who presents with headache.  Symptoms started 1.5 hours prior to my seeing her.  She states this started behind the right eye, was not 8/10 severity at onset, gradually worsened and migrated to the bifrontal region.  She has associated nausea and vomiting.  She states it feels like she has been stabbed in the head.  She denies any neck pain or stiffness, fevers or chills.  The pain is so severe that she feels like she might pass out.  Her vision is going \"in and out\" bilaterally.  She has not tried any medications for this.  She had a similar episode once in the past when her vision actually went completely black.  Only medication is metronidazole for BV.  She denies chance of pregnancy.    ROS: 10-point review of systems was performed and is otherwise negative except as noted in HPI.    Limitations to history: N/A    Independent Historians: N/A    External Records Reviewed: Outpatient notes in EMR    Past Medical History: Noncontributory except per HPI     Past Surgical History: Noncontributory except per HPI     Family History: Reviewed and noncontributory     Social History:  Denies tobacco. Denies ETOH. Denies illicit drugs.    Social Determinants Affecting Care: N/A    RX Allergies[1]    Home Meds:   Current Outpatient Medications   Medication Instructions    cephalexin (KEFLEX) 500 mg, oral, 2 times daily    etonogestrel-eluting contraceptive (Nexplanon) 68 mg implant implant 1 each, Once    metroNIDAZOLE (FLAGYL) 500 mg, oral, 2 times daily    metroNIDAZOLE (Metrogel) 0.75 % (37.5mg/5 gram) vaginal gel vaginal, 2 times weekly        Physical Exam     ED Triage Vitals [08/02/25 2138]   Temperature Heart Rate Respirations BP   36.7 °C (98.1 °F) 72 16 140/88      Pulse Ox Temp Source Heart Rate Source Patient Position   99 % Oral Monitor Sitting      BP Location FiO2 (%)     Right arm --         Heart Rate: " " [72]   Temperature:  [36.7 °C (98.1 °F)]   Respirations:  [16]   BP: (140)/(88)   Height:  [172.7 cm (5' 8\")]   Weight:  [72.6 kg (160 lb)]   Pulse Ox:  [99 %]      Physical Exam  Vitals and nursing note reviewed.     CONSTITUTIONAL: Well appearing, well nourished, in no acute distress.   HENMT: Head atraumatic. Airway patent. Nasal mucosa clear. Mouth with normal mucosa, clear oropharynx. Uvula midline. Neck supple.    EYES: Clear bilaterally, pupils equally round and reactive to light.   CARDIOVASCULAR: Normal rate, regular rhythm.  Heart sounds S1, S2.  No murmurs, rubs or gallops. Normal pulses. Capillary refill < 2 sec.   RESPIRATORY: No increased work of breathing. Breath sounds clear and equal bilaterally.  GASTROINTESTINAL: Abdomen soft, non-distended, non-tender. No rebound, no guarding. Normal bowel sounds. No palpable masses.  GENITOURINARY:  No CVA tenderness.  MUSCULOSKELETAL: Spine appears normal, range of motion is not limited, no muscle or joint tenderness. No edema.   NEUROLOGICAL: AAO x3. CN II-XII intact. 5/5 strength and SILT UEs/LEs. Negative Kernig and Brudzinski.  SKIN: Warm, dry and intact. No rash or notable lesions.  PSYCHIATRIC: Normal mood and affect.  HEME/LYMPH: No adenopathy or splenomegaly.    Diagnostic Results      ECG: ECGs read and interpreted by me. See ED Course, below, for interpretation.    Labs Reviewed   WET PREP, GENITAL - Abnormal       Result Value    Trichomonas None Seen      Clue Cells Present (*)     Yeast None Seen      WBC 21-50     URINALYSIS WITH REFLEX CULTURE AND MICROSCOPIC - Abnormal    Color, Urine Light-Yellow      Appearance, Urine Turbid (*)     Specific Gravity, Urine 1.016      pH, Urine 5.5      Protein, Urine NEGATIVE      Glucose, Urine Normal      Blood, Urine NEGATIVE      Ketones, Urine 20 (1+) (*)     Bilirubin, Urine NEGATIVE      Urobilinogen, Urine Normal      Nitrite, Urine NEGATIVE      Leukocyte Esterase, Urine 500 Barbara/uL (*)    MICROSCOPIC " ONLY, URINE - Abnormal    WBC, Urine >50 (*)     WBC Clumps, Urine OCCASIONAL      RBC, Urine 6-10 (*)     Squamous Epithelial Cells, Urine 1-9 (SPARSE)      Bacteria, Urine 1+ (*)    HCG, URINE, QUALITATIVE - Normal    HCG, Urine NEGATIVE     URINE CULTURE   URINALYSIS WITH REFLEX CULTURE AND MICROSCOPIC    Narrative:     The following orders were created for panel order Urinalysis with Reflex Culture and Microscopic.  Procedure                               Abnormality         Status                     ---------                               -----------         ------                     Urinalysis with Reflex C...[[id number]]  Abnormal            Final result               Extra Urine Gray Tube[[id number]]                            In process                   Please view results for these tests on the individual orders.   C. TRACHOMATIS / N. GONORRHOEAE, AMPLIFIED, UROGENITAL   EXTRA URINE GRAY TUBE         CT head wo IV contrast   Final Result   No acute intracranial abnormality.             MACRO:   None.        Signed by: Dakotah Newton 8/2/2025 11:50 PM   Dictation workstation:   [id number]                    No data recorded                Procedure  Procedures    ED Course & MDM   Assessment/Plan:   Job Metz is a 24 y.o. female with a history of MS given recurrent BV who presents with headache, nausea, vomiting, and dizziness.  Pain was not maximal intensity at onset but was pretty severe, initially right frontal and then migrated to the bifrontal region.  She feels like her vision is going in and out but has no constant vision changes.  No signs of meningitis.   Presentation is concerning for probable migraine, will rule out SAH or other occult intracranial process with CT head although neurologic exam is reassuring. See below for details of ED course and ultimate disposition.    Medications   cefTRIAXone (Rocephin) 1 g in dextrose (iso) IV 50 mL (has no administration in time range)    prochlorperazine (Compazine) injection 10 mg (10 mg intravenous Given 8/2/25 2315)   diphenhydrAMINE (BENADryl) injection 25 mg (25 mg intravenous Given 8/2/25 2316)   ketorolac (Toradol) injection 30 mg (30 mg intravenous Given 8/2/25 2313)   droperidol (Inapsine) injection 1.25 mg (1.25 mg intravenous Given 8/3/25 0023)        ED Course as of 08/03/25 0133   Sun Aug 03, 2025   0002 CTH no acute abnormality [CG]   0132 Patient was reassessed.  She feels better after droperidol and migraine cocktail.  She requests STI testing.  Urinalysis was obtained and shows possible UTI.  Ceftriaxone was given, will also cover for empiric gonorrhea.  She was prescribed full dose Flagyl for her clue cells on wet prep.  Patient was discharged home with anticipatory guidance and strict return precautions. [CG]      ED Course User Index  [CG] Lea Montoya MD         Diagnoses as of 08/03/25 0133   Other migraine without status migrainosus, not intractable   Urinary tract infection without hematuria, site unspecified   Bacterial vaginosis       Disposition:   DISCHARGE.  The patient was discharged with both verbal and written anticipatory guidance and strict return precautions. Discharge diagnosis, instructions and plan were discussed and understood. I emphasized the importance of following up with their primary care provider within 24-48 hours and with any referrals in the timeframe recommended. At the time of discharge the patient was comfortable and was in no apparent distress. Patient is aware of diagnostic uncertainty and was notified though testing is negative here, there is a very small chance that pathology may be missed.  The patient understands these risks and the patient/family understood to call 911 or return immediately to the emergency department if the symptoms worsen or if they have any additional concerns.      FOLLOW UP  Primary care provider in 1-2 days.      ED Prescriptions       Medication Sig Dispense  Start Date End Date Auth. Provider    metroNIDAZOLE (Flagyl) 500 mg tablet Take 1 tablet (500 mg) by mouth 2 times a day for 7 days. 14 tablet 8/3/2025 8/10/2025 Lea Montoya MD    cephalexin (Keflex) 500 mg capsule Take 1 capsule (500 mg) by mouth 2 times a day for 5 days. 10 capsule 8/3/2025 8/8/2025 MD Lea Mehta MD  EM/IM/Peds    This note was dictated by speech recognition. Minor errors in transcription may be present.         [1]   Allergies  Allergen Reactions    Latex Hives    Other Unknown     condoms        Lea Montoya MD  08/03/25 0133

## 2025-08-03 NOTE — DISCHARGE INSTRUCTIONS
You were seen today for migraine.  You also have a possible UTI and bacterial vaginosis.  We prescribed you antibiotics for this.  You can take acetaminophen (Tylenol) 650 mg every 6 hours and ibuprofen (Motrin) 600 mg every 6 hours, alternating, for pain control.  Your evaluation was not concerning for an emergency at this time. Please see the attached information sheet for information about your condition, how to care for your condition at home, and reasons to return to the emergency department. Take any prescriptions written today as prescribed. You should call your primary care provider within 24 hours to tell them about today's visit, including any new medications or medication changes, as he or she may want to see you in the office for further evaluation. If you do not have a primary care provider, call  [phone] for an appointment. We offer in-person office visits as well as virtual options. Please do not hesitate to call  [phone] or return to the emergency department with any new or unresolved concerns or symptoms. Thank you for choosing [facility] for your care.

## 2025-08-04 LAB
C TRACH RRNA SPEC QL NAA+PROBE: POSITIVE
HOLD SPECIMEN: NORMAL
N GONORRHOEA DNA SPEC QL PROBE+SIG AMP: NEGATIVE

## 2025-08-05 DIAGNOSIS — A74.9 CHLAMYDIA: Primary | ICD-10-CM

## 2025-08-05 LAB — BACTERIA UR CULT: ABNORMAL

## 2025-08-05 RX ORDER — DOXYCYCLINE 100 MG/1
100 CAPSULE ORAL 2 TIMES DAILY
Qty: 14 CAPSULE | Refills: 0 | Status: SHIPPED | OUTPATIENT
Start: 2025-08-05 | End: 2025-08-12

## 2025-08-06 ENCOUNTER — RESULTS FOLLOW-UP (OUTPATIENT)
Dept: PHARMACY | Facility: HOSPITAL | Age: 25
End: 2025-08-06
Payer: COMMERCIAL

## 2025-08-07 NOTE — PROGRESS NOTES
EDPD Note: Rapid Result Review    I reviewed Job Metz 's chart regarding a positive Chlamydia culture/result that was taken during their recent emergency room visit. The patient was not told about their finalized results prior to leaving the emergency department. patient was not contacted as proper education was given by another provider: Chester Francois MD . No further follow up needed from EDPD Team.     Susceptibility data from last 90 days.  Collected Specimen Info Organism Ampicillin Cefazolin Cefazolin (uncomplicated UTIs only) Ciprofloxacin Gentamicin Levofloxacin Nitrofurantoin Piperacillin/Tazobactam Trimethoprim/Sulfamethoxazole   08/03/25 Urine from Clean Catch/Voided Escherichia coli  S  S  S  S  S  S  S  S  S     Admission on 08/02/2025, Discharged on 08/03/2025   Component Date Value Ref Range Status    HCG, Urine 08/03/2025 NEGATIVE  NEGATIVE Final    Neisseria gonorrhea,Amplified 08/03/2025 Negative  Negative Final    Chlamydia trachomatis, Amplified 08/03/2025 Positive (A)  Negative Final    Trichomonas 08/03/2025 None Seen  None Seen Final    Clue Cells 08/03/2025 Present (A)  None Seen Final    Yeast 08/03/2025 None Seen  None Seen Final    WBC 08/03/2025 21-50   Final    Color, Urine 08/03/2025 Light-Yellow  Light-Yellow, Yellow, Dark-Yellow Final    Appearance, Urine 08/03/2025 Turbid (N)  Clear Final    Specific Gravity, Urine 08/03/2025 1.016  1.005 - 1.035 Final    pH, Urine 08/03/2025 5.5  5.0, 5.5, 6.0, 6.5, 7.0, 7.5, 8.0 Final    Protein, Urine 08/03/2025 NEGATIVE  NEGATIVE, 10 (TRACE), 20 (TRACE) mg/dL Final    Glucose, Urine 08/03/2025 Normal  Normal mg/dL Final    Blood, Urine 08/03/2025 NEGATIVE  NEGATIVE mg/dL Final    Ketones, Urine 08/03/2025 20 (1+) (A)  NEGATIVE mg/dL Final    Bilirubin, Urine 08/03/2025 NEGATIVE  NEGATIVE mg/dL Final    Urobilinogen, Urine 08/03/2025 Normal  Normal mg/dL Final    Nitrite, Urine 08/03/2025 NEGATIVE  NEGATIVE Final    Leukocyte Esterase, Urine  08/03/2025 500 Barbara/uL (A)  NEGATIVE Final    WBC, Urine 08/03/2025 >50 (A)  1-5, NONE /HPF Final    WBC Clumps, Urine 08/03/2025 OCCASIONAL  Reference range not established. /HPF Final    RBC, Urine 08/03/2025 6-10 (A)  NONE, 1-2, 3-5 /HPF Final    Squamous Epithelial Cells, Urine 08/03/2025 1-9 (SPARSE)  Reference range not established. /HPF Final    Bacteria, Urine 08/03/2025 1+ (A)  NONE SEEN /HPF Final    Urine Culture 08/03/2025 20,000 - 80,000 CFU/mL Escherichia coli (A)   Final       If there are any other questions for the ED Post-Discharge Culture Follow Up Team, please contact [phone]. Fax: [phone].    Debra Rodriguez

## 2025-08-27 ENCOUNTER — APPOINTMENT (OUTPATIENT)
Dept: OBSTETRICS AND GYNECOLOGY | Facility: CLINIC | Age: 25
End: 2025-08-27
Payer: COMMERCIAL

## 2025-08-27 ENCOUNTER — TELEPHONE (OUTPATIENT)
Dept: OBSTETRICS AND GYNECOLOGY | Facility: CLINIC | Age: 25
End: 2025-08-27

## 2025-08-27 DIAGNOSIS — A74.9 CHLAMYDIA: Primary | ICD-10-CM

## 2025-08-27 RX ORDER — DOXYCYCLINE 100 MG/1
100 CAPSULE ORAL 2 TIMES DAILY
Qty: 28 CAPSULE | Refills: 0 | Status: SHIPPED | OUTPATIENT
Start: 2025-08-27 | End: 2025-09-10